# Patient Record
Sex: FEMALE | Race: BLACK OR AFRICAN AMERICAN | NOT HISPANIC OR LATINO | URBAN - METROPOLITAN AREA
[De-identification: names, ages, dates, MRNs, and addresses within clinical notes are randomized per-mention and may not be internally consistent; named-entity substitution may affect disease eponyms.]

---

## 2018-07-06 ENCOUNTER — OFFICE VISIT CONVERTED (OUTPATIENT)
Dept: INTERNAL MEDICINE | Facility: CLINIC | Age: 48
End: 2018-07-06
Attending: PHYSICIAN ASSISTANT

## 2018-08-27 ENCOUNTER — OFFICE VISIT CONVERTED (OUTPATIENT)
Dept: INTERNAL MEDICINE | Facility: CLINIC | Age: 48
End: 2018-08-27
Attending: NURSE PRACTITIONER

## 2018-09-07 ENCOUNTER — OFFICE VISIT CONVERTED (OUTPATIENT)
Dept: INTERNAL MEDICINE | Facility: CLINIC | Age: 48
End: 2018-09-07
Attending: NURSE PRACTITIONER

## 2018-10-19 ENCOUNTER — OFFICE VISIT CONVERTED (OUTPATIENT)
Dept: INTERNAL MEDICINE | Facility: CLINIC | Age: 48
End: 2018-10-19
Attending: NURSE PRACTITIONER

## 2019-01-22 ENCOUNTER — OFFICE VISIT CONVERTED (OUTPATIENT)
Dept: INTERNAL MEDICINE | Facility: CLINIC | Age: 49
End: 2019-01-22
Attending: NURSE PRACTITIONER

## 2019-01-22 ENCOUNTER — CONVERSION ENCOUNTER (OUTPATIENT)
Dept: INTERNAL MEDICINE | Facility: CLINIC | Age: 49
End: 2019-01-22

## 2019-04-19 ENCOUNTER — HOSPITAL ENCOUNTER (OUTPATIENT)
Dept: OTHER | Facility: HOSPITAL | Age: 49
Discharge: HOME | End: 2019-04-19
Attending: NURSE PRACTITIONER

## 2019-04-19 LAB
ALBUMIN SERPL-MCNC: 3.6 G/DL (ref 3.5–5)
ALBUMIN/GLOB SERPL: 1.1 {RATIO} (ref 1.4–2.6)
ALP SERPL-CCNC: 70 U/L (ref 42–98)
ALT SERPL-CCNC: 19 U/L (ref 10–40)
ANION GAP SERPL CALC-SCNC: 14 MMOL/L (ref 8–19)
AST SERPL-CCNC: 15 U/L (ref 15–50)
BILIRUB SERPL-MCNC: 0.22 MG/DL (ref 0.2–1.3)
BUN SERPL-MCNC: 11 MG/DL (ref 5–25)
BUN/CREAT SERPL: 12 {RATIO} (ref 6–20)
CALCIUM SERPL-MCNC: 9.4 MG/DL (ref 8.7–10.4)
CHLORIDE SERPL-SCNC: 104 MMOL/L (ref 99–111)
CONV CO2: 25 MMOL/L (ref 22–32)
CONV TOTAL PROTEIN: 7 G/DL (ref 6.3–8.2)
CREAT UR-MCNC: 0.95 MG/DL (ref 0.5–0.9)
EST. AVERAGE GLUCOSE BLD GHB EST-MCNC: 123 MG/DL
GFR SERPLBLD BASED ON 1.73 SQ M-ARVRAT: >60 ML/MIN/{1.73_M2}
GLOBULIN UR ELPH-MCNC: 3.4 G/DL (ref 2–3.5)
GLUCOSE SERPL-MCNC: 92 MG/DL (ref 65–99)
HBA1C MFR BLD: 5.9 % (ref 3.5–5.7)
OSMOLALITY SERPL CALC.SUM OF ELEC: 285 MOSM/KG (ref 273–304)
POTASSIUM SERPL-SCNC: 4.5 MMOL/L (ref 3.5–5.3)
SODIUM SERPL-SCNC: 138 MMOL/L (ref 135–147)

## 2019-04-20 LAB — TSH SERPL-ACNC: 4.08 M[IU]/L (ref 0.27–4.2)

## 2019-04-22 ENCOUNTER — OFFICE VISIT CONVERTED (OUTPATIENT)
Dept: INTERNAL MEDICINE | Facility: CLINIC | Age: 49
End: 2019-04-22
Attending: NURSE PRACTITIONER

## 2019-10-17 ENCOUNTER — HOSPITAL ENCOUNTER (OUTPATIENT)
Dept: OTHER | Facility: HOSPITAL | Age: 49
Discharge: HOME | End: 2019-10-17
Attending: NURSE PRACTITIONER

## 2019-10-17 ENCOUNTER — OFFICE VISIT CONVERTED (OUTPATIENT)
Dept: INTERNAL MEDICINE | Facility: CLINIC | Age: 49
End: 2019-10-17
Attending: NURSE PRACTITIONER

## 2019-10-17 LAB
ALBUMIN SERPL-MCNC: 4 G/DL (ref 3.5–5)
ALBUMIN/GLOB SERPL: 1.1 {RATIO} (ref 1.4–2.6)
ALP SERPL-CCNC: 90 U/L (ref 42–98)
ALT SERPL-CCNC: 45 U/L (ref 10–40)
ANION GAP SERPL CALC-SCNC: 15 MMOL/L (ref 8–19)
AST SERPL-CCNC: 25 U/L (ref 15–50)
BASOPHILS # BLD AUTO: 0.04 10*3/UL (ref 0–0.2)
BASOPHILS NFR BLD AUTO: 0.6 % (ref 0–3)
BILIRUB SERPL-MCNC: 0.31 MG/DL (ref 0.2–1.3)
BUN SERPL-MCNC: 11 MG/DL (ref 5–25)
BUN/CREAT SERPL: 12 {RATIO} (ref 6–20)
CALCIUM SERPL-MCNC: 9.8 MG/DL (ref 8.7–10.4)
CHLORIDE SERPL-SCNC: 102 MMOL/L (ref 99–111)
CHOLEST SERPL-MCNC: 189 MG/DL (ref 107–200)
CHOLEST/HDLC SERPL: 2.7 {RATIO} (ref 3–6)
CONV ABS IMM GRAN: 0.01 10*3/UL (ref 0–0.2)
CONV CO2: 24 MMOL/L (ref 22–32)
CONV IMMATURE GRAN: 0.2 % (ref 0–1.8)
CONV TOTAL PROTEIN: 7.6 G/DL (ref 6.3–8.2)
CREAT UR-MCNC: 0.95 MG/DL (ref 0.5–0.9)
DEPRECATED RDW RBC AUTO: 49.1 FL (ref 36.4–46.3)
EOSINOPHIL # BLD AUTO: 0.02 10*3/UL (ref 0–0.7)
EOSINOPHIL # BLD AUTO: 0.3 % (ref 0–7)
ERYTHROCYTE [DISTWIDTH] IN BLOOD BY AUTOMATED COUNT: 14.6 % (ref 11.7–14.4)
EST. AVERAGE GLUCOSE BLD GHB EST-MCNC: 126 MG/DL
GFR SERPLBLD BASED ON 1.73 SQ M-ARVRAT: >60 ML/MIN/{1.73_M2}
GLOBULIN UR ELPH-MCNC: 3.6 G/DL (ref 2–3.5)
GLUCOSE SERPL-MCNC: 97 MG/DL (ref 65–99)
HBA1C MFR BLD: 6 % (ref 3.5–5.7)
HCT VFR BLD AUTO: 39.4 % (ref 37–47)
HDLC SERPL-MCNC: 70 MG/DL (ref 40–60)
HGB BLD-MCNC: 12.5 G/DL (ref 12–16)
LDLC SERPL CALC-MCNC: 99 MG/DL (ref 70–100)
LYMPHOCYTES # BLD AUTO: 3.48 10*3/UL (ref 1–5)
LYMPHOCYTES NFR BLD AUTO: 54 % (ref 20–45)
MCH RBC QN AUTO: 29.1 PG (ref 27–31)
MCHC RBC AUTO-ENTMCNC: 31.7 G/DL (ref 33–37)
MCV RBC AUTO: 91.6 FL (ref 81–99)
MONOCYTES # BLD AUTO: 0.41 10*3/UL (ref 0.2–1.2)
MONOCYTES NFR BLD AUTO: 6.4 % (ref 3–10)
NEUTROPHILS # BLD AUTO: 2.48 10*3/UL (ref 2–8)
NEUTROPHILS NFR BLD AUTO: 38.5 % (ref 30–85)
NRBC CBCN: 0 % (ref 0–0.7)
OSMOLALITY SERPL CALC.SUM OF ELEC: 283 MOSM/KG (ref 273–304)
PLATELET # BLD AUTO: 319 10*3/UL (ref 130–400)
PMV BLD AUTO: 9.7 FL (ref 9.4–12.3)
POTASSIUM SERPL-SCNC: 4.4 MMOL/L (ref 3.5–5.3)
RBC # BLD AUTO: 4.3 10*6/UL (ref 4.2–5.4)
SODIUM SERPL-SCNC: 137 MMOL/L (ref 135–147)
T4 FREE SERPL-MCNC: 1.1 NG/DL (ref 0.9–1.8)
TRIGL SERPL-MCNC: 101 MG/DL (ref 40–150)
TSH SERPL-ACNC: 4.66 M[IU]/L (ref 0.27–4.2)
VLDLC SERPL-MCNC: 20 MG/DL (ref 5–37)
WBC # BLD AUTO: 6.44 10*3/UL (ref 4.8–10.8)

## 2019-11-12 ENCOUNTER — HOSPITAL ENCOUNTER (OUTPATIENT)
Dept: OTHER | Facility: HOSPITAL | Age: 49
Discharge: HOME | End: 2019-11-12
Attending: NURSE PRACTITIONER

## 2019-11-12 LAB
T4 FREE SERPL-MCNC: 1 NG/DL (ref 0.9–1.8)
TSH SERPL-ACNC: 3.9 M[IU]/L (ref 0.27–4.2)

## 2019-11-13 LAB
CONV ANTI MICROSOMAL AB: 15 IU/ML (ref 0–34)
T3 SERPL-MCNC: 102 NG/DL (ref 71–180)
T3FREE SERPL-MCNC: 2.2 PG/ML (ref 2–4.4)
THYROGLOBULIN ANTIBODY: <1 IU/ML (ref 0–0.9)

## 2020-01-29 ENCOUNTER — HOSPITAL ENCOUNTER (OUTPATIENT)
Dept: OTHER | Facility: HOSPITAL | Age: 50
Discharge: HOME | End: 2020-01-29
Attending: NURSE PRACTITIONER

## 2020-01-29 LAB
ALBUMIN SERPL-MCNC: 4 G/DL (ref 3.5–5)
ALBUMIN/GLOB SERPL: 1.1 {RATIO} (ref 1.4–2.6)
ALP SERPL-CCNC: 75 U/L (ref 42–98)
ALT SERPL-CCNC: 13 U/L (ref 10–40)
ANION GAP SERPL CALC-SCNC: 18 MMOL/L (ref 8–19)
AST SERPL-CCNC: 15 U/L (ref 15–50)
BILIRUB SERPL-MCNC: 0.22 MG/DL (ref 0.2–1.3)
BUN SERPL-MCNC: 10 MG/DL (ref 5–25)
BUN/CREAT SERPL: 11 {RATIO} (ref 6–20)
CALCIUM SERPL-MCNC: 10.1 MG/DL (ref 8.7–10.4)
CHLORIDE SERPL-SCNC: 101 MMOL/L (ref 99–111)
CONV CO2: 25 MMOL/L (ref 22–32)
CONV TOTAL PROTEIN: 7.5 G/DL (ref 6.3–8.2)
CREAT UR-MCNC: 0.88 MG/DL (ref 0.5–0.9)
GFR SERPLBLD BASED ON 1.73 SQ M-ARVRAT: >60 ML/MIN/{1.73_M2}
GLOBULIN UR ELPH-MCNC: 3.5 G/DL (ref 2–3.5)
GLUCOSE SERPL-MCNC: 86 MG/DL (ref 65–99)
OSMOLALITY SERPL CALC.SUM OF ELEC: 286 MOSM/KG (ref 273–304)
POTASSIUM SERPL-SCNC: 4.6 MMOL/L (ref 3.5–5.3)
SODIUM SERPL-SCNC: 139 MMOL/L (ref 135–147)

## 2020-02-10 ENCOUNTER — OFFICE VISIT CONVERTED (OUTPATIENT)
Dept: INTERNAL MEDICINE | Facility: CLINIC | Age: 50
End: 2020-02-10
Attending: PHYSICIAN ASSISTANT

## 2020-02-10 ENCOUNTER — CONVERSION ENCOUNTER (OUTPATIENT)
Dept: INTERNAL MEDICINE | Facility: CLINIC | Age: 50
End: 2020-02-10

## 2020-02-10 ENCOUNTER — HOSPITAL ENCOUNTER (OUTPATIENT)
Dept: OTHER | Facility: HOSPITAL | Age: 50
Discharge: HOME | End: 2020-02-10
Attending: PHYSICIAN ASSISTANT

## 2020-02-12 ENCOUNTER — HOSPITAL ENCOUNTER (OUTPATIENT)
Dept: MRI IMAGING | Facility: HOSPITAL | Age: 50
Discharge: HOME | End: 2020-02-12
Attending: PHYSICIAN ASSISTANT

## 2020-02-25 ENCOUNTER — OFFICE VISIT CONVERTED (OUTPATIENT)
Dept: ORTHOPEDIC SURGERY | Facility: CLINIC | Age: 50
End: 2020-02-25
Attending: ORTHOPAEDIC SURGERY

## 2020-04-24 ENCOUNTER — TELEMEDICINE CONVERTED (OUTPATIENT)
Dept: INTERNAL MEDICINE | Facility: CLINIC | Age: 50
End: 2020-04-24
Attending: NURSE PRACTITIONER

## 2020-06-05 ENCOUNTER — TELEMEDICINE CONVERTED (OUTPATIENT)
Dept: INTERNAL MEDICINE | Facility: CLINIC | Age: 50
End: 2020-06-05
Attending: NURSE PRACTITIONER

## 2020-09-10 ENCOUNTER — HOSPITAL ENCOUNTER (OUTPATIENT)
Dept: OTHER | Facility: HOSPITAL | Age: 50
Discharge: HOME | End: 2020-09-10
Attending: NURSE PRACTITIONER

## 2020-09-10 LAB
ALBUMIN SERPL-MCNC: 4 G/DL (ref 3.5–5)
ALBUMIN/GLOB SERPL: 1.3 {RATIO} (ref 1.4–2.6)
ALP SERPL-CCNC: 83 U/L (ref 42–98)
ALT SERPL-CCNC: 22 U/L (ref 10–40)
ANION GAP SERPL CALC-SCNC: 16 MMOL/L (ref 8–19)
AST SERPL-CCNC: 20 U/L (ref 15–50)
BILIRUB SERPL-MCNC: 0.24 MG/DL (ref 0.2–1.3)
BUN SERPL-MCNC: 10 MG/DL (ref 5–25)
BUN/CREAT SERPL: 9 {RATIO} (ref 6–20)
CALCIUM SERPL-MCNC: 9.7 MG/DL (ref 8.7–10.4)
CHLORIDE SERPL-SCNC: 105 MMOL/L (ref 99–111)
CONV CO2: 24 MMOL/L (ref 22–32)
CONV TOTAL PROTEIN: 7.2 G/DL (ref 6.3–8.2)
CREAT UR-MCNC: 1.15 MG/DL (ref 0.5–0.9)
EST. AVERAGE GLUCOSE BLD GHB EST-MCNC: 131 MG/DL
GFR SERPLBLD BASED ON 1.73 SQ M-ARVRAT: >60 ML/MIN/{1.73_M2}
GLOBULIN UR ELPH-MCNC: 3.2 G/DL (ref 2–3.5)
GLUCOSE SERPL-MCNC: 107 MG/DL (ref 65–99)
HBA1C MFR BLD: 6.2 % (ref 3.5–5.7)
OSMOLALITY SERPL CALC.SUM OF ELEC: 292 MOSM/KG (ref 273–304)
POTASSIUM SERPL-SCNC: 4.4 MMOL/L (ref 3.5–5.3)
SODIUM SERPL-SCNC: 141 MMOL/L (ref 135–147)
T4 FREE SERPL-MCNC: 1 NG/DL (ref 0.9–1.8)
TSH SERPL-ACNC: 4.7 M[IU]/L (ref 0.27–4.2)

## 2020-09-14 ENCOUNTER — TELEPHONE CONVERTED (OUTPATIENT)
Dept: INTERNAL MEDICINE | Facility: CLINIC | Age: 50
End: 2020-09-14
Attending: NURSE PRACTITIONER

## 2020-10-26 ENCOUNTER — HOSPITAL ENCOUNTER (OUTPATIENT)
Dept: OTHER | Facility: HOSPITAL | Age: 50
Discharge: HOME | End: 2020-10-26
Attending: NURSE PRACTITIONER

## 2020-10-26 LAB
T4 FREE SERPL-MCNC: 1 NG/DL (ref 0.9–1.8)
TSH SERPL-ACNC: 4.87 M[IU]/L (ref 0.27–4.2)

## 2020-10-27 LAB — T3 SERPL-MCNC: 107 NG/DL (ref 71–180)

## 2020-12-14 ENCOUNTER — HOSPITAL ENCOUNTER (OUTPATIENT)
Dept: OTHER | Facility: HOSPITAL | Age: 50
Discharge: HOME | End: 2020-12-14
Attending: NURSE PRACTITIONER

## 2020-12-14 LAB
ANION GAP SERPL CALC-SCNC: 13 MMOL/L (ref 8–19)
BUN SERPL-MCNC: 9 MG/DL (ref 5–25)
BUN/CREAT SERPL: 10 {RATIO} (ref 6–20)
CALCIUM SERPL-MCNC: 9 MG/DL (ref 8.7–10.4)
CHLORIDE SERPL-SCNC: 105 MMOL/L (ref 99–111)
CONV CO2: 25 MMOL/L (ref 22–32)
CREAT UR-MCNC: 0.9 MG/DL (ref 0.5–0.9)
EST. AVERAGE GLUCOSE BLD GHB EST-MCNC: 128 MG/DL
GFR SERPLBLD BASED ON 1.73 SQ M-ARVRAT: >60 ML/MIN/{1.73_M2}
GLUCOSE SERPL-MCNC: 100 MG/DL (ref 65–99)
HBA1C MFR BLD: 6.1 % (ref 3.5–5.7)
OSMOLALITY SERPL CALC.SUM OF ELEC: 287 MOSM/KG (ref 273–304)
POTASSIUM SERPL-SCNC: 4.3 MMOL/L (ref 3.5–5.3)
SODIUM SERPL-SCNC: 139 MMOL/L (ref 135–147)
T4 FREE SERPL-MCNC: 1.1 NG/DL (ref 0.9–1.8)
TSH SERPL-ACNC: 3.67 M[IU]/L (ref 0.27–4.2)

## 2020-12-17 ENCOUNTER — TELEMEDICINE CONVERTED (OUTPATIENT)
Dept: INTERNAL MEDICINE | Facility: CLINIC | Age: 50
End: 2020-12-17
Attending: NURSE PRACTITIONER

## 2021-05-12 NOTE — PROGRESS NOTES
Progress Note      Patient Name: Rocío Nash   Patient ID: [id number]   Sex: Female   YOB: 1970    Primary Care Provider: Mckenna KAMARA   Referring Provider: Mckenna KAMARA    Visit Date: 2020    Provider: ANH Amaya   Location: [facility]   Location Address: [address] [place] [address]  [id number]   Location Phone: [phone]          History Of Present Illness  Video Conferencing Visit  Rocío Nash is a 49 year old /Black female who is presenting for evaluation via video conferencing. Verbal consent obtained before beginning visit.   The following staff were present during this visit: Mckenna Mauro NP; hermila Correa   Rocío Nash is a 49 year old /Black female who presents for evaluation and treatment of:      left shoulder pain-has not been able to start PT due to covid-19 closures    weight loss  shoulder pain has kept her from some exercises   she has noticed that her clothing is fitting looser  she forgets to drink water frequently    impaired fasting glucose- doing okay with metformin but wants to lose weight and get off of this    currently in [employer]     via zoom       Past Medical History  Disease Name Date Onset Notes   Allergic rhinitis --  --    Asthma --  --    Diabetes --  --    Leg pain --  --    Migraine --  --    Osteoarthritis --  --          Past Surgical History  Procedure Name Date Notes    1997 --    Cesarian Section --  --          Medication List  Name Date Started Instructions   diclofenac sodium 75 mg oral tablet,delayed release (/EC) 2020 take 1 tablet (75 mg) by oral route 2 times per day   metformin 500 mg oral tablet 2020 take 2 tablets (1,000 mg) by oral route 2 times per day with morning and evening meals for 90 days   Zyrtec 10 mg oral tablet 10/17/2019 take 1 tablet (10 mg) by oral route once daily         Allergy  List  Allergen Name Date Reaction Notes   aspirin --  --  --    PENICILLINS --  --  --        Allergies Reconciled  Family Medical History  Disease Name Relative/Age Notes   Stroke Mother/   Mother   Heart Disease Father/   Father         Social History  Finding Status Start/Stop Quantity Notes   Alcohol Never --/-- --  --    Alcohol Use Never --/-- --  does not drink   lives with spouse --  --/-- --  --    . --  --/-- --  --    Recreational Drug Use Never --/-- --  no   Student. --  --/-- --  --    Tobacco Former --/-- --  former smoker         Immunizations  NameDate Admin Mfg Trade Name Lot Number Route Inj VIS Given VIS Publication   Urrxqdrnk31/17/2019 [facility] Fluzone Quadrivalent [id number] IM RD 10/17/2019    Comments: patient tolerated well         Review of Systems  · Constitutional  o Denies  o : fever, fatigue, weight loss, weight gain  · Cardiovascular  o Denies  o : lower extremity edema, claudication, chest pressure, palpitations  · Respiratory  o Denies  o : shortness of breath, wheezing, frequent cough, hemoptysis, dyspnea on exertion  · Gastrointestinal  o Denies  o : nausea, vomiting, diarrhea, constipation, abdominal pain      Physical Examination     Gen: well-nourished, no acute distress  HENT: atraumatic, normocephalic  Eyes: extraocular movements intact, no scleral icterus  Lung: breathing comfortably, no cough  Skin: no visible rash, no lesions  Neuro: grossly oriented to person, place, and time. no facial droop   Psych: normal mood and affect             Assessment  · Impaired fasting glucose     790.21/R73.01  she will continue with diet and exercise. cont metformin. will recheck lab in about 6 wks when covid-19 risk is lower  · Obesity     278.00/E66.9  diet and exercise discussed at length and how to work around shoulder and back pain  · Left shoulder pain     719.41/M25.512  will mail her exercises to start at home. then she will begin PT when they reopen    Problems  Reconciled  Plan  · Orders  o Thyroid Profile (05903, 13993, THYII) - 790.21/R73.01, 278.00/E66.9, 719.41/M25.512 - 05/24/2020  o ACO-39: Current medications updated and reviewed () - - 04/24/2020  · Medications  o Medications have been Reconciled  o Transition of Care or Provider Policy  · Instructions  o Patient was educated/instructed on their diagnosis, treatment and medications prior to discharge from the clinic today.  o Call the office with any concerns or questions.  · Disposition  o Call or Return if symptoms worsen or persist.  o Follow up in 6 weeks  o Labs to be printed            Electronically Signed by: ANH Amaya -Author on April 24, 2020 08:54:50 AM

## 2021-05-13 NOTE — PROGRESS NOTES
Progress Note      Patient Name: Rocío Nash   Patient ID: [id number]   Sex: Female   YOB: 1970    Primary Care Provider: Mckenna KAMARA   Referring Provider: Mckenna KAMARA    Visit Date: 2020    Provider: ANH Amaya   Location: [facility]   Location Address: [address] [place] [address]  [id number]   Location Phone: [phone]          Chief Complaint  · f/u  · questions about metformin      History Of Present Illness  Video Conferencing Visit  Rocío Nash is a 49 year old /Black female who is presenting for evaluation via video conferencing via zoom. Verbal consent obtained before beginning visit.   The following staff were present during this visit: Mckenna Mauro NP      woudl like refill of diclofenac  shoulder causing her a lot of pain    questions about metformin, received 2 letters, 1 from FDA about carcinogens  is having some issues eating healthy  knows what she needs to eat, just having trouble getting motivated to do it  exercise bike coming. she previously used an elliptical but is now bothersome to her shoulder    some anxiety at night, not sleeping well, worrying about things  she reports knowing what she needs to do but trouble getting motivated    she believes she may be perimenopausal  period in dec and then last month  some irritability   Rocío Nash is a 49 year old /Black female who presents for evaluation and treatment of:       Past Medical History  Disease Name Date Onset Notes   Allergic rhinitis --  --    Asthma --  --    Diabetes --  --    Leg pain --  --    Migraine --  --    Osteoarthritis --  --          Past Surgical History  Procedure Name Date Notes    1997 --    Cesarian Section --  --          Medication List  Name Date Started Instructions   diclofenac sodium 75 mg oral tablet,delayed release (/EC) 2020 take 1 tablet (75 mg) by oral route 2  times per day   metformin 500 mg oral tablet 04/24/2020 take 2 tablets (1,000 mg) by oral route 2 times per day with morning and evening meals for 90 days   Zyrtec 10 mg oral tablet 10/17/2019 take 1 tablet (10 mg) by oral route once daily         Allergy List  Allergen Name Date Reaction Notes   aspirin --  --  --    PENICILLINS --  --  --          Family Medical History  Disease Name Relative/Age Notes   Stroke Mother/   Mother   Heart Disease Father/   Father         Social History  Finding Status Start/Stop Quantity Notes   Alcohol Never --/-- --  --    Alcohol Use Never --/-- --  does not drink   lives with spouse --  --/-- --  --    . --  --/-- --  --    Recreational Drug Use Never --/-- --  no   Student. --  --/-- --  --    Tobacco Former 25/38 .5ppd former smoker         Immunizations  NameDate Admin Mfg Trade Name Lot Number Route Inj VIS Given VIS Publication   Oqomsnbhh08/17/2019 [facility] Fluzone Quadrivalent [id number] IM RD 10/17/2019    Comments: patient tolerated well         Review of Systems  · Constitutional  o Denies  o : fever, fatigue, weight loss, weight gain  · Cardiovascular  o Denies  o : lower extremity edema, claudication, chest pressure, palpitations  · Respiratory  o Denies  o : shortness of breath, wheezing, frequent cough, hemoptysis, dyspnea on exertion  · Gastrointestinal  o Denies  o : nausea, vomiting, diarrhea, constipation, abdominal pain      Physical Examination     Gen: well-nourished, no acute distress  HENT: atraumatic, normocephalic  Eyes: extraocular movements intact, no scleral icterus  Lung: breathing comfortably, no cough  Skin: no visible rash, no lesions  Neuro: grossly oriented to person, place, and time. no facial droop   Psych: normal mood and affect             Assessment  · Impaired fasting glucose     790.21/R73.01  will repeat labs in 3 mths, stopping metformin at present r/t safety concerns. She will work on diet and exercise  · Insomnia,  unspecified     780.52/G47.00  she does not wish to take medications. discussed sleep hygiene, thought stopping  · Shoulder pain, left     719.41/M25.512  will start PT, continue diclofenac. discussed GI irritation risk  · Perimenopausal     627.2/N95.1  discussed symptoms, role of labs, and symptom management. she reports symptoms are tolerable at present      Plan  · Orders  o Hgb A1c [facility] (50803) - 790.21/R73.01 - 09/05/2020  o CMP [facility] (63039) - 719.41/M25.512, 790.21/R73.01 - 09/05/2020  o Thyroid Profile (THYII, 76751, 70535) - 719.41/M25.512, 790.21/R73.01 - 09/05/2020  o ACO-39: Current medications updated and reviewed () - - 06/05/2020  o PHYSICAL THERAPY CONSULTATION ([facility]) - 719.41/M25.512 - 06/05/2020  · Medications  o diclofenac sodium 75 mg oral tablet,delayed release (DR/EC)   SIG: take 1 tablet (75 mg) by oral route 2 times per day for 90 days   DISP: (180) tablets with 1 refills  Adjusted on 06/05/2020     o metformin 500 mg oral tablet   SIG: take 2 tablets (1,000 mg) by oral route 2 times per day with morning and evening meals for 90 days   DISP: (360) tablets with 1 refills  Discontinued on 06/05/2020     o Medications have been Reconciled  o Transition of Care or Provider Policy  · Instructions  o Patient was educated/instructed on their diagnosis, treatment and medications prior to discharge from the clinic today.  · Disposition  o Call or Return if symptoms worsen or persist.  o Follow up in 3 months  o Labs to be printed  o Prescriptions sent electronically            Electronically Signed by: Mckenna Mauro APRN -Author on June 5, 2020 09:02:29 AM

## 2021-05-13 NOTE — PROGRESS NOTES
"   Progress Note      Patient Name: Rocío Nash   Patient ID: [id number]   Sex: Female   YOB: 1970    Primary Care Provider: Mckenna KAMARA   Referring Provider: Mckenna KAMARA    Visit Date: 2020    Provider: ANH Amaya   Location: [facility]   Location Address: [address] [place] [address]  [id number]   Location Phone: [phone]          Chief Complaint  · follow up  · \"getting lab results\"      History Of Present Illness  TELEHEALTH TELEPHONE VISIT  Rocío Nash is a 50 year old /Black female who is presenting for evaluation via telehealth telephone visit. Verbal consent obtained before beginning visit.   Provider spent 24 minutes with the patient during the telehealth visit.   The following staff were present during this visit: Mckenna Mauro NP   Past Medical History/ Overview of Patient Symptoms  Rocío Nash is a 50 year old /Black female who presents for evaluation and treatment of:      she has lost some weight again, back down to 230 lbs  she is slowly making changes to diet  she is exercising on the treadmill routinely  taking berberine to help lower glucose levels  she reports this helps with appetite, eating smaller portions  carb counting    TSH abnormal, abnormal in 10/19 followed by normal result    creatinine-taking diclofenac prn, not taking frequently       Past Medical History  Disease Name Date Onset Notes   Allergic rhinitis --  --    Asthma --  --    Diabetes --  --    Impaired fasting glucose --  --    Leg pain --  --    Migraine --  --    Osteoarthritis --  --          Past Surgical History  Procedure Name Date Notes    1997 --    Cesarian Section --  --          Medication List  Name Date Started Instructions   diclofenac sodium 75 mg oral tablet,delayed release (/EC) 2020 take 1 tablet (75 mg) by oral route 2 times per day for 90 days   Zyrtec 10 mg " oral tablet 10/17/2019 take 1 tablet (10 mg) by oral route once daily         Allergy List  Allergen Name Date Reaction Notes   aspirin --  --  --    PENICILLINS --  --  --          Family Medical History  Disease Name Relative/Age Notes   Stroke Mother/   Mother   Heart Disease Father/   Father         Social History  Finding Status Start/Stop Quantity Notes   Alcohol Never --/-- --  --    Alcohol Use Never --/-- --  does not drink   lives with spouse --  --/-- --  --    . --  --/-- --  --    Recreational Drug Use Never --/-- --  no   Student. --  --/-- --  --    Tobacco Former 25/38 .5ppd former smoker         Immunizations  NameDate Admin Mfg Trade Name Lot Number Route Inj VIS Given VIS Publication   Egyuqgmnm09/17/2019 [facility] Fluzone Quadrivalent [id number] IM RD 10/17/2019    Comments: patient tolerated well         Review of Systems  · Constitutional  o Admits  o : weight loss  o Denies  o : fever, fatigue, weight gain  · Cardiovascular  o Denies  o : lower extremity edema, claudication, chest pressure, palpitations  · Respiratory  o Denies  o : shortness of breath, wheezing, cough, hemoptysis, dyspnea on exertion  · Gastrointestinal  o Denies  o : nausea, vomiting, diarrhea, constipation, abdominal pain      Physical Examination     Gen: No acute distress  Resp: No cough, no audible wheezing  Neuro: Grossly oriented to person, place, and time  Psych: Normal mood and affect           Assessment  · Hypothyroidism     244.9/E03.9  And she has had multiple elevated TSHs in the last year that have not been subsequent. Discussed starting Synthroid at this time versus rechecking in 6 weeks. Antibodies previously not present. We will repeat labs in 6 weeks to see if her TSH is back in normal range.  · Impaired fasting glucose     790.21/R73.01  Discussed continuing with diet and exercise to help achieve weight loss. Discussed A1c today and goal to reduce this to avoid diabetes in the future. She previously had  some concerns regarding metformin safety. If A1c is elevated again in the future, consider SGLT2 class medication.  · Obesity     278.00/E66.9  · Screen for colon cancer     V76.51/Z12.11  · Creatinine elevation     790.99/R79.89  Discussed potential causes of creatinine elevation. She will avoid NSAIDs and hydrate well. Will monitor in 3 months at follow-up.      Plan  · Orders  o T3 (Total) (93958) - 244.9/E03.9 - 10/26/2020  o Thyroid Profile (33159, 31074, THYII) - 244.9/E03.9 - 10/26/2020  o Hgb A1c [facility] (37890) - 790.21/R73.01 - 12/14/2020  o BMP [facility] (36052) - 244.9/E03.9, 790.21/R73.01 - 12/14/2020  o Physician Telephone Evaluation, 21-30 minutes (99636) - - 09/14/2020  o ACO-39: Current medications updated and reviewed () - - 09/14/2020  o Gastroenterology Consultation (GASTR) - V76.51/Z12.11 - 09/14/2020  · Medications  o Medications have been Reconciled  o Transition of Care or Provider Policy  · Instructions  o Plan Of Care:   o Chronic conditions reviewed and taken into consideration for today's treatment plan.  o Patient instructed to seek medical attention urgently for new or worsening symptoms.  o Discussed Covid-19 precautions including, but not limited to, social distancing, avoid touching your face, and hand washing.   o Patient was educated/instructed on their diagnosis, treatment and medications prior to discharge from the clinic today.  · Disposition  o Call or Return if symptoms worsen or persist.  o Follow up in 3 months  o Labs to be printed            Electronically Signed by: Mckenna Mauro APRN -Author on September 14, 2020 02:07:47 PM

## 2021-05-14 NOTE — PROGRESS NOTES
Progress Note      Patient Name: Rocío Nash   Patient ID: [id number]   Sex: Female   YOB: 1970    Primary Care Provider: Mckenna KAMARA   Referring Provider: Mckenna KAMARA    Visit Date: 2020    Provider: ANH Amaya   Location: [facility]   Location Address: [address] [place] [address]  [id number]   Location Phone: [phone]          Chief Complaint  · Telehealth/Zoom      History Of Present Illness  Video Conferencing Visit  Rocío Nash is a 50 year old /Black female who is presenting for evaluation via video conferencing via Zoom. Verbal consent obtained before beginning visit.   The following staff were present during this visit: Mckenna Mauro,NP;Eh XAVIER CMA      f/u  she reports that her weight has been holding steady.  She reports she has been feeling a little bit better and having more energy since starting the Synthroid.  She is considering doing more of a plant-based diet to help her self to feel better.  She is trying to exercise but her knee occasionally keeps her from this.    she is not currently in school.  she is not currently working.  still active in [employer]    no family hx of colon cancer, no abnormal     Rocío Nash is a 50 year old /Black female who presents for evaluation and treatment of:       Past Medical History  Disease Name Date Onset Notes   Allergic rhinitis --  --    Asthma --  --    Diabetes --  --    Impaired fasting glucose --  --    Leg pain --  --    Migraine --  --    Osteoarthritis --  --          Past Surgical History  Procedure Name Date Notes    1997 --    Cesarian Section --  --          Medication List  Name Date Started Instructions   diclofenac sodium 75 mg oral tablet,delayed release (/EC) 2020 take 1 tablet (75 mg) by oral route 2 times per day for 90 days   Synthroid 25 mcg oral tablet 2020 take 1 tablet (25 mcg) by  oral route once daily on an empty stomach 30 minutes before breakfast for 30 days   Zyrtec 10 mg oral tablet 12/17/2020 take 1 tablet (10 mg) by oral route once daily for 90 days         Allergy List  Allergen Name Date Reaction Notes   aspirin --  --  --    PENICILLINS --  --  --        Allergies Reconciled  Family Medical History  Disease Name Relative/Age Notes   Stroke Mother/   Mother   Heart Disease Father/   Father         Social History  Finding Status Start/Stop Quantity Notes   Alcohol Never --/-- --  --    Alcohol Use Never --/-- --  does not drink   lives with spouse --  --/-- --  --    . --  --/-- --  --    Recreational Drug Use Never --/-- --  no   Student. --  --/-- --  --    Tobacco Former 25/38 .5ppd former smoker         Immunizations  NameDate Admin Mfg Trade Name Lot Number Route Inj VIS Given VIS Publication   Kixidomyk42/17/2019 [facility] Fluzone Quadrivalent [id number] IM RD 10/17/2019    Comments: patient tolerated well         Review of Systems  · Constitutional  o Admits  o : fatigue  o Denies  o : fever, weight loss, weight gain  · Cardiovascular  o Denies  o : lower extremity edema, claudication, chest pressure, palpitations  · Respiratory  o Denies  o : shortness of breath, wheezing, cough, hemoptysis, dyspnea on exertion  · Gastrointestinal  o Denies  o : nausea, vomiting, diarrhea, constipation, abdominal pain      Physical Examination     Gen: well-nourished, no acute distress  HENT: atraumatic, normocephalic  Eyes: extraocular movements intact, no scleral icterus  Lung: breathing comfortably, no cough  Skin: no visible rash, no lesions  Neuro: grossly oriented to person, place, and time. no facial droop   Psych: normal mood and affect                 Assessment  · Impaired fasting glucose     790.21/R73.01  She is doing well with current medications and diet modification.  · Allergic rhinitis due to allergen     477.9/J30.9  She recently ran out of Zyrtec and reports that symptoms  have flared up since this time. She would like to restart Zyrtec  · Hypothyroidism     244.9/E03.9  will continue at 25 mics currently. Discussed that she is at the upper end of normal TSH range and we could consider increasing to 50 mics daily. We will repeat labs in 8 weeks and reconsider at that time. Discussed at length how thyroid can affect many bodily processes.  · Screening for colon cancer     V76.51/Z12.11  · Visit for screening mammogram     V76.12/Z12.31      Plan  · Orders  o Thyroid Profile (24706, 01715, THYII) - 244.9/E03.9 - 02/11/2021  o Cologuard (35616) - V76.51/Z12.11 - 12/17/2020  o Screening Mammography; Bilateral 2D (24590, ) - V76.12/Z12.31 - 12/17/2020   [place]   o CBC with Auto Diff [facility] (12593) - 790.21/R73.01, 244.9/E03.9 - 04/17/2021  o CMP [facility] (22706) - 790.21/R73.01, 244.9/E03.9 - 02/11/2021  o Hgb A1c [facility] (93867) - 790.21/R73.01, 244.9/E03.9 - 04/17/2021  o Lipid Panel [facility] (61564) - 790.21/R73.01, 244.9/E03.9 - 04/17/2021  o Thyroid Profile (75621, 54227, THYII) - 790.21/R73.01, 244.9/E03.9 - 04/17/2021  o ACO-39: Current medications updated and reviewed (1159F, ) - - 12/17/2020  o CMP [facility] (35585) - 790.21/R73.01, 244.9/E03.9, V76.12/Z12.31, V76.51/Z12.11 - 04/17/2021  · Medications  o Synthroid 25 mcg oral tablet   SIG: take 1 tablet (25 mcg) by oral route once daily on an empty stomach 30 minutes before breakfast for 30 days   DISP: (30) Tablet with 1 refills  Adjusted on 12/17/2020     o Zyrtec 10 mg oral tablet   SIG: take 1 tablet (10 mg) by oral route once daily for 90 days   DISP: (90) Tablet with 3 refills  Adjusted on 12/17/2020     o Medications have been Reconciled  o Transition of Care or Provider Policy  · Instructions  o Patient was educated/instructed on their diagnosis, treatment and medications prior to discharge from the clinic today.  o Call the office with any concerns or questions.  · Disposition  o Call or Return if symptoms worsen or  persist.  o Follow up in 2 months  o Labs to be printed  o Prescriptions sent electronically            Electronically Signed by: Mckenna Mauro APRN -Author on December 17, 2020 05:05:31 PM

## 2021-05-15 VITALS
BODY MASS INDEX: 36.26 KG/M2 | TEMPERATURE: 97.2 F | SYSTOLIC BLOOD PRESSURE: 122 MMHG | OXYGEN SATURATION: 100 % | HEART RATE: 98 BPM | HEIGHT: 67 IN | RESPIRATION RATE: 15 BRPM | DIASTOLIC BLOOD PRESSURE: 74 MMHG | WEIGHT: 231 LBS

## 2021-05-15 VITALS — HEART RATE: 87 BPM | WEIGHT: 232.5 LBS | HEIGHT: 67 IN | BODY MASS INDEX: 36.49 KG/M2 | OXYGEN SATURATION: 98 %

## 2021-05-15 VITALS
OXYGEN SATURATION: 99 % | HEIGHT: 67 IN | DIASTOLIC BLOOD PRESSURE: 86 MMHG | SYSTOLIC BLOOD PRESSURE: 118 MMHG | BODY MASS INDEX: 36.27 KG/M2 | RESPIRATION RATE: 12 BRPM | TEMPERATURE: 97.6 F | WEIGHT: 231.12 LBS | HEART RATE: 69 BPM

## 2021-05-15 VITALS
DIASTOLIC BLOOD PRESSURE: 84 MMHG | HEART RATE: 87 BPM | TEMPERATURE: 97.8 F | WEIGHT: 232.12 LBS | SYSTOLIC BLOOD PRESSURE: 120 MMHG | OXYGEN SATURATION: 99 %

## 2021-05-15 VITALS
DIASTOLIC BLOOD PRESSURE: 80 MMHG | HEIGHT: 67 IN | HEART RATE: 95 BPM | OXYGEN SATURATION: 99 % | RESPIRATION RATE: 15 BRPM | TEMPERATURE: 97.6 F | SYSTOLIC BLOOD PRESSURE: 124 MMHG | BODY MASS INDEX: 36.45 KG/M2 | WEIGHT: 232.25 LBS

## 2021-05-16 VITALS
DIASTOLIC BLOOD PRESSURE: 72 MMHG | RESPIRATION RATE: 14 BRPM | HEART RATE: 77 BPM | OXYGEN SATURATION: 97 % | HEIGHT: 67 IN | SYSTOLIC BLOOD PRESSURE: 122 MMHG | TEMPERATURE: 97.1 F | WEIGHT: 244.25 LBS | BODY MASS INDEX: 38.34 KG/M2

## 2021-05-16 VITALS
DIASTOLIC BLOOD PRESSURE: 90 MMHG | HEART RATE: 87 BPM | BODY MASS INDEX: 38.61 KG/M2 | OXYGEN SATURATION: 98 % | SYSTOLIC BLOOD PRESSURE: 126 MMHG | RESPIRATION RATE: 15 BRPM | TEMPERATURE: 97.1 F | HEIGHT: 67 IN | WEIGHT: 246 LBS

## 2021-05-16 VITALS
WEIGHT: 246 LBS | RESPIRATION RATE: 15 BRPM | SYSTOLIC BLOOD PRESSURE: 112 MMHG | OXYGEN SATURATION: 100 % | DIASTOLIC BLOOD PRESSURE: 72 MMHG | TEMPERATURE: 97.7 F | BODY MASS INDEX: 38.61 KG/M2 | HEART RATE: 88 BPM | HEIGHT: 67 IN

## 2021-05-16 VITALS
DIASTOLIC BLOOD PRESSURE: 86 MMHG | BODY MASS INDEX: 37.39 KG/M2 | WEIGHT: 238.25 LBS | SYSTOLIC BLOOD PRESSURE: 130 MMHG | RESPIRATION RATE: 15 BRPM | HEART RATE: 77 BPM | HEIGHT: 67 IN | TEMPERATURE: 98.2 F | OXYGEN SATURATION: 98 %

## 2021-05-24 ENCOUNTER — HOSPITAL ENCOUNTER (OUTPATIENT)
Dept: OTHER | Facility: HOSPITAL | Age: 51
Discharge: HOME | End: 2021-05-24
Attending: NURSE PRACTITIONER

## 2021-05-24 ENCOUNTER — OFFICE VISIT CONVERTED (OUTPATIENT)
Dept: INTERNAL MEDICINE | Facility: CLINIC | Age: 51
End: 2021-05-24
Attending: NURSE PRACTITIONER

## 2021-05-24 LAB
EST. AVERAGE GLUCOSE BLD GHB EST-MCNC: 140 MG/DL
HBA1C MFR BLD: 6.5 % (ref 3.5–5.7)
T4 FREE SERPL-MCNC: 1.2 NG/DL (ref 0.9–1.8)
TSH SERPL-ACNC: 2.02 M[IU]/L (ref 0.27–4.2)

## 2021-06-05 NOTE — PROGRESS NOTES
"   Progress Note      Patient Name: Rocío Nash   Patient ID: [id number]   Sex: Female   YOB: 1970    Primary Care Provider: Mckenna KAMARA   Referring Provider: Mckenna KAMARA    Visit Date: May 24, 2021    Provider: ANH Amaya   Location: [facility]   Location Address: [address] [place] [address], [facility]  [place], [place]  [id number]   Location Phone: [phone]          Chief Complaint  · Follow up   · \"I need my thyroid checked\"   · \"I never got my box from cologuard\"       History Of Present Illness  Rocío Nash is a 50 year old /Black female who presents for evaluation and treatment of:      follow up     She mentions she has a lot going on personally, which has caused her some stress, but she says it is good stress.     She is more fatigued and worried that she needs her thyroid medication increased.  Would like her labs rechecked this visit    She says never received her cologuard box; our office is looking in to the situation.     Says she slept on her stomach last and woke up with a sore left shoulder/back muscle. She reports it feels \"tight\". She says she has had trouble with this shoulder before. Not currently taking any anti-inflammatories.       Past Medical History  Disease Name Date Onset Notes   Allergic rhinitis --  --    Asthma --  --    Diabetes --  --    Impaired fasting glucose --  --    Leg pain --  --    Migraine --  --    Osteoarthritis --  --          Past Surgical History  Procedure Name Date Notes    1997 --    Cesarian Section --  --          Medication List  Name Date Started Instructions   Synthroid 25 mcg oral tablet 2021 take 1 tablet (25 mcg) by oral route once daily on an empty stomach 30 minutes before breakfast for 30 days   Zyrtec 10 mg oral tablet 2020 take 1 tablet (10 mg) by oral route once daily for 90 days         Allergy List  Allergen Name Date Reaction Notes   aspirin --  --  --  " "  PENICILLINS --  --  --        Allergies Reconciled  Family Medical History  Disease Name Relative/Age Notes   Stroke Mother/   Mother   Heart Disease Father/   Father         Social History  Finding Status Start/Stop Quantity Notes   Alcohol Never --/-- --  --    Alcohol Use Never --/-- --  does not drink   lives with spouse --  --/-- --  --    . --  --/-- --  --    Recreational Drug Use Never --/-- --  no   Student. --  --/-- --  --    Tobacco Former 25/38 .5ppd former smoker         Immunizations  NameDate Admin Mfg Trade Name Lot Number Route Inj VIS Given VIS Publication   COVID Lauri&John04/09/2021 NE Not Entered  NE NE 05/24/2021    Comments:    Rgvrfqnrz76/17/2019 [facility] Fluzone Quadrivalent [id number] IM RD 10/17/2019    Comments: patient tolerated well         Review of Systems  · Constitutional  o Denies  o : fever, fatigue, weight loss, weight gain  · Cardiovascular  o Denies  o : lower extremity edema, claudication, chest pressure, palpitations  · Respiratory  o Denies  o : shortness of breath, wheezing, cough, hemoptysis, dyspnea on exertion  · Gastrointestinal  o Denies  o : nausea, vomiting, diarrhea, constipation, abdominal pain      Vitals  Date Time BP Position Site L\R Cuff Size HR RR TEMP (F) WT  HT  BMI kg/m2 BSA m2 O2 Sat FR L/min FiO2 HC       02/10/2020 09:16 /84 Sitting    87 - R  97.8 232lbs 2oz    99 %  21%    02/25/2020 08:31 AM      87 - R   232lbs 8oz 5'  7\" 36.41 2.23 98 %      05/24/2021 09:16 /78 Sitting    106 - R  97.7 245lbs 6oz 5'  7\" 38.43 2.29 99 %  21%          Physical Examination  · Constitutional  o Appearance  o : no acute distress, well-nourished  · Head and Face  o Head  o :   § Inspection  § : atraumatic, normocephalic  · Eyes  o Eyes  o : extraocular movements intact, no scleral icterus, no conjunctival injection  · Ears, Nose, Mouth and Throat  o Ears  o :   § External Ears  § : normal  o Nose  o :   § Intranasal Exam  § : nares patent  o Oral " Cavity  o :   § Oral Mucosa  § : moist mucous membranes  · Respiratory  o Respiratory Effort  o : breathing comfortably, symmetric chest rise  o Auscultation of Lungs  o : clear to asculatation bilaterally, no wheezes, rales, or rhonchii  · Cardiovascular  o Heart  o :   § Auscultation of Heart  § : regular rate and rhythm, no murmurs, rubs, or gallops  o Peripheral Vascular System  o :   § Extremities  § : no edema  · Musculoskeletal  o General  o :   § General Musculoskeletal  § : No joint swelling or deformity., Muscle tone, strength, and development grossly normal.  o Left Upper Extremity  o :   § Inspection/Palpation  § : Tenderness reported with palpation to left posterior back - teres minor/major muscle  § Joint Stability  § : shoulder, elbow and wrist joint stability normal  § Range of Motion  § : range of motion normal, no joint crepitus present, no pain with joint motion  · Neurologic  o Mental Status Examination  o :   § Orientation  § : grossly oriented to person, place and time  o Gait and Station  o :   § Gait Screening  § : normal gait  · Psychiatric  o General  o : normal mood and affect          Assessment  · Impaired fasting glucose     790.21/R73.01  Will recheck A1C this visit due to being slightly elevated last visit - patient is not fasting this morning  · Screening for depression     V79.0/Z13.89  · Hypothyroidism     244.9/E03.9  will recheck labs this visit  · Left shoulder pain     719.41/M25.512  Will send in short burst of ibuprofen for anti-inflammatory. No suspicious of injury, and with normal physical exam, more suspicious of muscle strain from sleeping on her shoulder wrong. Instructed her to use ice/heat for muscle pain and if the pain doesn't improve over the next few days with anti inflammatory to call the office. The patient doesn't want any muscle relaxers at this time.    Problems Reconciled  Plan  · Orders  o ACO-18: Positive screen for clinical depression using a standardized  tool and a follow-up plan documented () - V79.0/Z13.89 - 05/24/2021   Phq9 score of 8.-Not difficult at all.   o Hgb A1c [facility] (30065) - 790.21/R73.01 - 05/24/2021  o Thyroid Profile (35105, THYII, 71862) - 244.9/E03.9 - 05/24/2021   Discussed increasing the medicine even if at the upper limits of normal   o ACO-39: Current medications updated and reviewed (1159F, ) - - 05/24/2021  · Medications  o ibuprofen 800 mg oral tablet   SIG: take 1 tablet (800 mg) by oral route 3 times per day for 5 days   DISP: (30) Tablet with 1 refills  Prescribed on 05/24/2021     o diclofenac sodium 75 mg oral tablet,delayed release (DR/EC)   SIG: take 1 tablet (75 mg) by oral route 2 times per day for 90 days   DISP: (180) tablets with 1 refills  Discontinued on 05/24/2021     o Medications have been Reconciled  o Transition of Care or Provider Policy  · Instructions  o Depression Screen completed and scanned into the EMR under the designated folder within the patient's documents.  o Patient was educated/instructed on their diagnosis, treatment and medications prior to discharge from the clinic today.  o Call the office with any concerns or questions.  · Disposition  o Call or Return if symptoms worsen or persist.  o Follow up in 6 months  o Prescriptions sent electronically            Electronically Signed by: ANH Amaya -Author on May 24, 2021 05:21:46 PM

## 2021-07-07 ENCOUNTER — CLINICAL SUPPORT (OUTPATIENT)
Dept: INTERNAL MEDICINE | Facility: CLINIC | Age: 51
End: 2021-07-07

## 2021-07-07 DIAGNOSIS — E03.9 HYPOTHYROIDISM, UNSPECIFIED TYPE: Primary | ICD-10-CM

## 2021-07-07 LAB
T4 FREE SERPL-MCNC: 1.17 NG/DL (ref 0.93–1.7)
TSH SERPL DL<=0.05 MIU/L-ACNC: 2.46 UIU/ML (ref 0.27–4.2)

## 2021-07-07 PROCEDURE — 36415 COLL VENOUS BLD VENIPUNCTURE: CPT | Performed by: INTERNAL MEDICINE

## 2021-07-07 PROCEDURE — 84439 ASSAY OF FREE THYROXINE: CPT | Performed by: INTERNAL MEDICINE

## 2021-07-07 PROCEDURE — 84443 ASSAY THYROID STIM HORMONE: CPT | Performed by: INTERNAL MEDICINE

## 2021-07-15 VITALS
SYSTOLIC BLOOD PRESSURE: 124 MMHG | HEART RATE: 106 BPM | HEIGHT: 67 IN | OXYGEN SATURATION: 99 % | BODY MASS INDEX: 38.51 KG/M2 | TEMPERATURE: 97.7 F | WEIGHT: 245.37 LBS | DIASTOLIC BLOOD PRESSURE: 78 MMHG

## 2023-10-31 ENCOUNTER — OFFICE VISIT (OUTPATIENT)
Dept: INTERNAL MEDICINE | Facility: CLINIC | Age: 53
End: 2023-10-31
Payer: OTHER GOVERNMENT

## 2023-10-31 VITALS
HEIGHT: 67 IN | TEMPERATURE: 96.9 F | WEIGHT: 218 LBS | HEART RATE: 75 BPM | DIASTOLIC BLOOD PRESSURE: 88 MMHG | OXYGEN SATURATION: 100 % | SYSTOLIC BLOOD PRESSURE: 142 MMHG | BODY MASS INDEX: 34.21 KG/M2

## 2023-10-31 DIAGNOSIS — Z12.31 SCREENING MAMMOGRAM FOR BREAST CANCER: ICD-10-CM

## 2023-10-31 DIAGNOSIS — L72.3 SEBACEOUS CYST: Primary | ICD-10-CM

## 2023-10-31 PROCEDURE — 87070 CULTURE OTHR SPECIMN AEROBIC: CPT | Performed by: PHYSICIAN ASSISTANT

## 2023-10-31 PROCEDURE — 87205 SMEAR GRAM STAIN: CPT | Performed by: PHYSICIAN ASSISTANT

## 2023-10-31 RX ORDER — SULFAMETHOXAZOLE AND TRIMETHOPRIM 800; 160 MG/1; MG/1
1 TABLET ORAL 2 TIMES DAILY
Qty: 20 TABLET | Refills: 0 | Status: SHIPPED | OUTPATIENT
Start: 2023-10-31 | End: 2023-11-10

## 2023-10-31 RX ORDER — LIDOCAINE HYDROCHLORIDE AND EPINEPHRINE 10; 10 MG/ML; UG/ML
5 INJECTION, SOLUTION INFILTRATION; PERINEURAL ONCE
Status: SHIPPED | OUTPATIENT
Start: 2023-10-31

## 2023-10-31 NOTE — PROGRESS NOTES
"Chief Complaint  Mass (On back )    Subjective          Rocío Nash presents to [facility]    Mass- patient has had mass on her upper back for a couple years now.  It has not bothered her until the past few days.  She noticed that it seemed to get larger and become more tender.  She has had trouble sleeping on her back because of the pressure.  No drainage from the area.  No body aches or fevers.     Needs mammogram order    Objective   Vital Signs:   /88   Pulse 75   Temp 96.9 °F (36.1 °C)   Ht 170.2 cm (67\")   Wt 98.9 kg (218 lb)   SpO2 100%   BMI 34.14 kg/m²     Physical Exam  Vitals reviewed.   Constitutional:       Appearance: Normal appearance. She is well-developed.   HENT:      Head: Normocephalic and atraumatic.   Eyes:      Conjunctiva/sclera: Conjunctivae normal.      Pupils: Pupils are equal, round, and reactive to light.   Cardiovascular:      Rate and Rhythm: Normal rate and regular rhythm.      Heart sounds: No murmur heard.     No friction rub. No gallop.   Pulmonary:      Effort: Pulmonary effort is normal.      Breath sounds: Normal breath sounds. No wheezing or rhonchi.   Skin:     General: Skin is warm and dry.   Neurological:      Mental Status: She is alert and oriented to person, place, and time.      Cranial Nerves: No cranial nerve deficit.   Psychiatric:         Mood and Affect: Mood and affect normal.         Behavior: Behavior normal.         Thought Content: Thought content normal.         Judgment: Judgment normal.        Result Review :          Incision & Drainage    Date/Time: 10/31/2023 4:07 PM    Performed by: Guillermina Berry PA-C  Authorized by: Guillermina Berry PA-C  Type: cyst  Body area: trunk  Location details: back  Anesthesia: local infiltration    Anesthesia:  Local Anesthetic: lidocaine 1% with epinephrine  Anesthetic total: 1 mL    Sedation:  Patient sedated: no    Scalpel size: 11  Needle gauge: 20  Incision " type: single straight  Drainage: purulent  Drainage amount: copious  Wound treatment: wound left open  Packing material: none  Patient tolerance: patient tolerated the procedure well with no immediate complications            Assessment and Plan    Diagnoses and all orders for this visit:    1. Sebaceous cyst (Primary)  Assessment & Plan:  I&D performed in office.  Sample gotten, will send for culture and put patient on bactrim empirically.  Wash with soap and water, watch for fevers, body aches or erythema.  Discussed possible general surgery referral but patient wants to hold off for now.  Will have her follow up with PCP in one month to reassess.      Orders:  -     Wound Culture - Wound, Back, Upper    2. Screening mammogram for breast cancer  -     Mammo Screening Digital Tomosynthesis Bilateral With CAD; Future  -     lidocaine 1% - EPINEPHrine 1:322563 (XYLOCAINE W/EPI) 1 %-1:260824 injection 5 mL    Other orders  -     sulfamethoxazole-trimethoprim (Bactrim DS) 800-160 MG per tablet; Take 1 tablet by mouth 2 (Two) Times a Day for 10 days.  Dispense: 20 tablet; Refill: 0  -     Incision & Drainage              Follow Up   No follow-ups on file.  Patient was given instructions and counseling regarding her condition or for health maintenance advice. Please see specific information pulled into the AVS if appropriate.

## 2023-10-31 NOTE — ASSESSMENT & PLAN NOTE
I&D performed in office.  Sample gotten, will send for culture and put patient on bactrim empirically.  Wash with soap and water, watch for fevers, body aches or erythema.  Discussed possible general surgery referral but patient wants to hold off for now.  Will have her follow up with PCP in one month to reassess.

## 2023-11-04 LAB
BACTERIA SPEC AEROBE CULT: NORMAL
GRAM STN SPEC: NORMAL

## 2023-11-15 DIAGNOSIS — Z12.31 SCREENING MAMMOGRAM FOR BREAST CANCER: ICD-10-CM

## 2023-11-20 ENCOUNTER — OFFICE VISIT (OUTPATIENT)
Dept: INTERNAL MEDICINE | Facility: CLINIC | Age: 53
End: 2023-11-20
Payer: OTHER GOVERNMENT

## 2023-11-20 VITALS
TEMPERATURE: 96.2 F | HEIGHT: 67 IN | DIASTOLIC BLOOD PRESSURE: 80 MMHG | SYSTOLIC BLOOD PRESSURE: 122 MMHG | WEIGHT: 218 LBS | BODY MASS INDEX: 34.21 KG/M2 | HEART RATE: 69 BPM | OXYGEN SATURATION: 99 % | RESPIRATION RATE: 18 BRPM

## 2023-11-20 DIAGNOSIS — Z00.00 ANNUAL PHYSICAL EXAM: Primary | ICD-10-CM

## 2023-11-20 DIAGNOSIS — N17.9 AKI (ACUTE KIDNEY INJURY): ICD-10-CM

## 2023-11-20 DIAGNOSIS — Z11.59 NEED FOR HEPATITIS C SCREENING TEST: ICD-10-CM

## 2023-11-20 DIAGNOSIS — R73.03 PREDIABETES: ICD-10-CM

## 2023-11-20 DIAGNOSIS — L72.3 SEBACEOUS CYST: ICD-10-CM

## 2023-11-20 LAB
ALBUMIN SERPL-MCNC: 4.3 G/DL (ref 3.5–5.2)
ALBUMIN/GLOB SERPL: 1.4 G/DL
ALP SERPL-CCNC: 77 U/L (ref 39–117)
ALT SERPL W P-5'-P-CCNC: 17 U/L (ref 1–33)
ANION GAP SERPL CALCULATED.3IONS-SCNC: 9.9 MMOL/L (ref 5–15)
AST SERPL-CCNC: 20 U/L (ref 1–32)
BILIRUB SERPL-MCNC: <0.2 MG/DL (ref 0–1.2)
BUN SERPL-MCNC: 10 MG/DL (ref 6–20)
BUN/CREAT SERPL: 7.9 (ref 7–25)
CALCIUM SPEC-SCNC: 9.9 MG/DL (ref 8.6–10.5)
CHLORIDE SERPL-SCNC: 102 MMOL/L (ref 98–107)
CHOLEST SERPL-MCNC: 195 MG/DL (ref 0–200)
CO2 SERPL-SCNC: 26.1 MMOL/L (ref 22–29)
CREAT SERPL-MCNC: 1.27 MG/DL (ref 0.57–1)
DEPRECATED RDW RBC AUTO: 47.5 FL (ref 37–54)
EGFRCR SERPLBLD CKD-EPI 2021: 51.3 ML/MIN/1.73
ERYTHROCYTE [DISTWIDTH] IN BLOOD BY AUTOMATED COUNT: 14.2 % (ref 12.3–15.4)
GLOBULIN UR ELPH-MCNC: 3.1 GM/DL
GLUCOSE SERPL-MCNC: 94 MG/DL (ref 65–99)
HBA1C MFR BLD: 6.1 % (ref 4.8–5.6)
HCT VFR BLD AUTO: 42.2 % (ref 34–46.6)
HCV AB SER DONR QL: NORMAL
HDLC SERPL-MCNC: 73 MG/DL (ref 40–60)
HGB BLD-MCNC: 13.3 G/DL (ref 12–15.9)
LDLC SERPL CALC-MCNC: 102 MG/DL (ref 0–100)
LDLC/HDLC SERPL: 1.36 {RATIO}
LYMPHOCYTES # BLD MANUAL: 4.86 10*3/MM3 (ref 0.7–3.1)
LYMPHOCYTES NFR BLD MANUAL: 6 % (ref 5–12)
MCH RBC QN AUTO: 28.9 PG (ref 26.6–33)
MCHC RBC AUTO-ENTMCNC: 31.5 G/DL (ref 31.5–35.7)
MCV RBC AUTO: 91.7 FL (ref 79–97)
MONOCYTES # BLD: 0.43 10*3/MM3 (ref 0.1–0.9)
NEUTROPHILS # BLD AUTO: 1.86 10*3/MM3 (ref 1.7–7)
NEUTROPHILS NFR BLD MANUAL: 26 % (ref 42.7–76)
NRBC BLD AUTO-RTO: 0 /100 WBC (ref 0–0.2)
PLAT MORPH BLD: NORMAL
PLATELET # BLD AUTO: 320 10*3/MM3 (ref 140–450)
PMV BLD AUTO: 10.4 FL (ref 6–12)
POLYCHROMASIA BLD QL SMEAR: ABNORMAL
POTASSIUM SERPL-SCNC: 4.5 MMOL/L (ref 3.5–5.2)
PROT SERPL-MCNC: 7.4 G/DL (ref 6–8.5)
RBC # BLD AUTO: 4.6 10*6/MM3 (ref 3.77–5.28)
SODIUM SERPL-SCNC: 138 MMOL/L (ref 136–145)
TRIGL SERPL-MCNC: 114 MG/DL (ref 0–150)
TSH SERPL DL<=0.05 MIU/L-ACNC: 3.61 UIU/ML (ref 0.27–4.2)
VARIANT LYMPHS NFR BLD MANUAL: 68 % (ref 19.6–45.3)
VLDLC SERPL-MCNC: 20 MG/DL (ref 5–40)
WBC MORPH BLD: NORMAL
WBC NRBC COR # BLD AUTO: 7.14 10*3/MM3 (ref 3.4–10.8)

## 2023-11-20 PROCEDURE — 84443 ASSAY THYROID STIM HORMONE: CPT | Performed by: NURSE PRACTITIONER

## 2023-11-20 PROCEDURE — 80061 LIPID PANEL: CPT | Performed by: NURSE PRACTITIONER

## 2023-11-20 PROCEDURE — 85025 COMPLETE CBC W/AUTO DIFF WBC: CPT | Performed by: NURSE PRACTITIONER

## 2023-11-20 PROCEDURE — 85007 BL SMEAR W/DIFF WBC COUNT: CPT | Performed by: NURSE PRACTITIONER

## 2023-11-20 PROCEDURE — 83036 HEMOGLOBIN GLYCOSYLATED A1C: CPT | Performed by: NURSE PRACTITIONER

## 2023-11-20 PROCEDURE — 86803 HEPATITIS C AB TEST: CPT | Performed by: NURSE PRACTITIONER

## 2023-11-20 PROCEDURE — 80053 COMPREHEN METABOLIC PANEL: CPT | Performed by: NURSE PRACTITIONER

## 2023-11-20 NOTE — PROGRESS NOTES
"Chief Complaint  Sebaceous cyst  (Follow up from seeing Guillermina on 10/31/23)  Annual physical  Subjective          Rocío Nash presents to [facility]  History of Present Illness  Annual physical    She reports having a sebaceous cyst. She had I&D on 10/31. Has finished abx. Reports this is still draining. She has been taking tylenol for pain    Obesity-She is doing nuum program for weight loss  She is learning how to cope with stress  Reports doing well with weight loss    Prediabetes-previously took metformin but had abdominal cramping and vomiting  Prefers not to take medications at this time    She reports having perimenopausal symptoms, irregular period  Hot flashes, decreased libido  Does not wish to try medication at this time  Objective   Vital Signs:   /80 (BP Location: Left arm, Patient Position: Sitting, Cuff Size: Adult)   Pulse 69   Temp 96.2 °F (35.7 °C)   Resp 18   Ht 170.2 cm (67\")   Wt 98.9 kg (218 lb)   SpO2 99%   BMI 34.14 kg/m²     Physical Exam  Vitals and nursing note reviewed.   Constitutional:       General: She is not in acute distress.     Appearance: Normal appearance.   HENT:      Head: Normocephalic and atraumatic.      Right Ear: External ear normal.      Left Ear: External ear normal.      Nose: Nose normal.      Mouth/Throat:      Mouth: Mucous membranes are moist.   Eyes:      Conjunctiva/sclera: Conjunctivae normal.   Cardiovascular:      Rate and Rhythm: Normal rate and regular rhythm.      Pulses: Normal pulses.      Heart sounds: Normal heart sounds. No murmur heard.     No friction rub. No gallop.   Pulmonary:      Effort: Pulmonary effort is normal. No respiratory distress.      Breath sounds: No wheezing, rhonchi or rales.   Musculoskeletal:      Cervical back: Neck supple.      Right lower leg: No edema.      Left lower leg: No edema.   Skin:     General: Skin is warm and dry.      Comments: Sebaceous cyst of right " upper back, clear drainage present without surrounding erythema   Neurological:      General: No focal deficit present.      Mental Status: She is alert and oriented to person, place, and time.   Psychiatric:         Mood and Affect: Mood normal.         Behavior: Behavior normal.        Result Review :          Procedures      Assessment and Plan    Diagnoses and all orders for this visit:    1. Annual physical exam (Primary)  -     CBC & Differential  -     Comprehensive Metabolic Panel  -     Hemoglobin A1c  -     Lipid Panel  -     TSH    2. Sebaceous cyst    3. Prediabetes  Comments:  Discussed rechecking today.  Discussed GLP-1 class medication if A1c above 7  Orders:  -     Hemoglobin A1c    4. Need for hepatitis C screening test  -     Hepatitis C Antibody    Other orders  -     Fluzone (or Fluarix & Flulaval for VFC) >6mos    sebaceous cyst-Infection seems to be resolved. Discussed options for removal with general surgery given prolonged issues from this. Patient will wait to see if this improves a call if this worsens over the next few weeks for referral.    40 to 64: Counseling/Anticipatory Guidance Discussed: nutrition, physical activity, healthy weight, injury prevention, mental health, immunizations, and screenings      Follow Up   Return in about 6 months (around 5/20/2024).  Patient was given instructions and counseling regarding her condition or for health maintenance advice. Please see specific information pulled into the AVS if appropriate.

## 2023-12-05 ENCOUNTER — CLINICAL SUPPORT (OUTPATIENT)
Dept: INTERNAL MEDICINE | Facility: CLINIC | Age: 53
End: 2023-12-05
Payer: OTHER GOVERNMENT

## 2023-12-05 DIAGNOSIS — N17.9 AKI (ACUTE KIDNEY INJURY): ICD-10-CM

## 2023-12-05 LAB
ANION GAP SERPL CALCULATED.3IONS-SCNC: 8.5 MMOL/L (ref 5–15)
BUN SERPL-MCNC: 11 MG/DL (ref 6–20)
BUN/CREAT SERPL: 11.7 (ref 7–25)
CALCIUM SPEC-SCNC: 9.7 MG/DL (ref 8.6–10.5)
CHLORIDE SERPL-SCNC: 103 MMOL/L (ref 98–107)
CO2 SERPL-SCNC: 26.5 MMOL/L (ref 22–29)
CREAT SERPL-MCNC: 0.94 MG/DL (ref 0.57–1)
EGFRCR SERPLBLD CKD-EPI 2021: 73.6 ML/MIN/1.73
GLUCOSE SERPL-MCNC: 100 MG/DL (ref 65–99)
POTASSIUM SERPL-SCNC: 4.4 MMOL/L (ref 3.5–5.2)
SODIUM SERPL-SCNC: 138 MMOL/L (ref 136–145)

## 2023-12-05 PROCEDURE — 36415 COLL VENOUS BLD VENIPUNCTURE: CPT | Performed by: NURSE PRACTITIONER

## 2023-12-05 NOTE — PROGRESS NOTES
Venipuncture Blood Specimen Collection  Venipuncture performed in [facility] by Trena Campbell RN with good hemostasis. Patient tolerated the procedure well without complications.   12/05/23   Trena Campbell RN

## 2024-12-09 ENCOUNTER — OFFICE VISIT (OUTPATIENT)
Dept: INTERNAL MEDICINE | Facility: CLINIC | Age: 54
End: 2024-12-09
Payer: OTHER GOVERNMENT

## 2024-12-09 VITALS
DIASTOLIC BLOOD PRESSURE: 76 MMHG | BODY MASS INDEX: 37.51 KG/M2 | HEART RATE: 71 BPM | TEMPERATURE: 96.6 F | RESPIRATION RATE: 18 BRPM | OXYGEN SATURATION: 99 % | SYSTOLIC BLOOD PRESSURE: 122 MMHG | WEIGHT: 239 LBS | HEIGHT: 67 IN

## 2024-12-09 DIAGNOSIS — R73.03 PREDIABETES: ICD-10-CM

## 2024-12-09 DIAGNOSIS — N92.6 IRREGULAR PERIODS: ICD-10-CM

## 2024-12-09 DIAGNOSIS — Z12.31 ENCOUNTER FOR SCREENING MAMMOGRAM FOR MALIGNANT NEOPLASM OF BREAST: Primary | ICD-10-CM

## 2024-12-09 DIAGNOSIS — R23.2 HOT FLASHES: ICD-10-CM

## 2024-12-09 DIAGNOSIS — Z23 NEED FOR INFLUENZA VACCINATION: ICD-10-CM

## 2024-12-09 DIAGNOSIS — R14.0 ABDOMINAL BLOATING: ICD-10-CM

## 2024-12-09 DIAGNOSIS — Z12.11 SCREEN FOR COLON CANCER: ICD-10-CM

## 2024-12-09 DIAGNOSIS — Z51.81 MEDICATION MONITORING ENCOUNTER: ICD-10-CM

## 2024-12-09 DIAGNOSIS — Z00.00 ANNUAL PHYSICAL EXAM: ICD-10-CM

## 2024-12-09 DIAGNOSIS — R41.89 BRAIN FOG: ICD-10-CM

## 2024-12-09 LAB
25(OH)D3 SERPL-MCNC: 74.5 NG/ML (ref 30–100)
ALBUMIN SERPL-MCNC: 4.1 G/DL (ref 3.5–5.2)
ALBUMIN/GLOB SERPL: 1.2 G/DL
ALP SERPL-CCNC: 74 U/L (ref 39–117)
ALT SERPL W P-5'-P-CCNC: 15 U/L (ref 1–33)
ANION GAP SERPL CALCULATED.3IONS-SCNC: 8.4 MMOL/L (ref 5–15)
AST SERPL-CCNC: 19 U/L (ref 1–32)
BASOPHILS # BLD MANUAL: 0 10*3/MM3 (ref 0–0.2)
BASOPHILS NFR BLD MANUAL: 0 % (ref 0–1.5)
BILIRUB SERPL-MCNC: 0.2 MG/DL (ref 0–1.2)
BUN SERPL-MCNC: 13 MG/DL (ref 6–20)
BUN/CREAT SERPL: 13.4 (ref 7–25)
CALCIUM SPEC-SCNC: 9.9 MG/DL (ref 8.6–10.5)
CHLORIDE SERPL-SCNC: 103 MMOL/L (ref 98–107)
CHOLEST SERPL-MCNC: 194 MG/DL (ref 0–200)
CO2 SERPL-SCNC: 27.6 MMOL/L (ref 22–29)
CREAT SERPL-MCNC: 0.97 MG/DL (ref 0.57–1)
DEPRECATED RDW RBC AUTO: 42.5 FL (ref 37–54)
EGFRCR SERPLBLD CKD-EPI 2021: 70.5 ML/MIN/1.73
EOSINOPHIL # BLD MANUAL: 0 10*3/MM3 (ref 0–0.4)
EOSINOPHIL NFR BLD MANUAL: 0 % (ref 0.3–6.2)
ERYTHROCYTE [DISTWIDTH] IN BLOOD BY AUTOMATED COUNT: 13 % (ref 12.3–15.4)
FSH SERPL-ACNC: 55.9 MIU/ML
GLOBULIN UR ELPH-MCNC: 3.4 GM/DL
GLUCOSE SERPL-MCNC: 100 MG/DL (ref 65–99)
HBA1C MFR BLD: 6.1 % (ref 4.8–5.6)
HCT VFR BLD AUTO: 41.4 % (ref 34–46.6)
HDLC SERPL-MCNC: 68 MG/DL (ref 40–60)
HGB BLD-MCNC: 13.1 G/DL (ref 12–15.9)
LDLC SERPL CALC-MCNC: 113 MG/DL (ref 0–100)
LDLC/HDLC SERPL: 1.64 {RATIO}
LH SERPL-ACNC: 25 MIU/ML
LYMPHOCYTES # BLD MANUAL: 4.17 10*3/MM3 (ref 0.7–3.1)
LYMPHOCYTES NFR BLD MANUAL: 3.1 % (ref 5–12)
MCH RBC QN AUTO: 28.7 PG (ref 26.6–33)
MCHC RBC AUTO-ENTMCNC: 31.6 G/DL (ref 31.5–35.7)
MCV RBC AUTO: 90.6 FL (ref 79–97)
MONOCYTES # BLD: 0.21 10*3/MM3 (ref 0.1–0.9)
NEUTROPHILS # BLD AUTO: 2.54 10*3/MM3 (ref 1.7–7)
NEUTROPHILS NFR BLD MANUAL: 36.7 % (ref 42.7–76)
NRBC BLD AUTO-RTO: 0 /100 WBC (ref 0–0.2)
PLAT MORPH BLD: NORMAL
PLATELET # BLD AUTO: 289 10*3/MM3 (ref 140–450)
PMV BLD AUTO: 10.1 FL (ref 6–12)
POTASSIUM SERPL-SCNC: 4.6 MMOL/L (ref 3.5–5.2)
PROGEST SERPL-MCNC: 0.16 NG/ML
PROT SERPL-MCNC: 7.5 G/DL (ref 6–8.5)
RBC # BLD AUTO: 4.57 10*6/MM3 (ref 3.77–5.28)
RBC MORPH BLD: NORMAL
SODIUM SERPL-SCNC: 139 MMOL/L (ref 136–145)
TRIGL SERPL-MCNC: 73 MG/DL (ref 0–150)
TSH SERPL DL<=0.05 MIU/L-ACNC: 4.05 UIU/ML (ref 0.27–4.2)
VARIANT LYMPHS NFR BLD MANUAL: 60.2 % (ref 19.6–45.3)
VIT B12 BLD-MCNC: >2000 PG/ML (ref 211–946)
VLDLC SERPL-MCNC: 13 MG/DL (ref 5–40)
WBC MORPH BLD: NORMAL
WBC NRBC COR # BLD AUTO: 6.92 10*3/MM3 (ref 3.4–10.8)

## 2024-12-09 PROCEDURE — 82306 VITAMIN D 25 HYDROXY: CPT | Performed by: NURSE PRACTITIONER

## 2024-12-09 PROCEDURE — 83002 ASSAY OF GONADOTROPIN (LH): CPT | Performed by: NURSE PRACTITIONER

## 2024-12-09 PROCEDURE — 84443 ASSAY THYROID STIM HORMONE: CPT | Performed by: NURSE PRACTITIONER

## 2024-12-09 PROCEDURE — 99396 PREV VISIT EST AGE 40-64: CPT | Performed by: NURSE PRACTITIONER

## 2024-12-09 PROCEDURE — 85025 COMPLETE CBC W/AUTO DIFF WBC: CPT | Performed by: NURSE PRACTITIONER

## 2024-12-09 PROCEDURE — 85007 BL SMEAR W/DIFF WBC COUNT: CPT | Performed by: NURSE PRACTITIONER

## 2024-12-09 PROCEDURE — 83001 ASSAY OF GONADOTROPIN (FSH): CPT | Performed by: NURSE PRACTITIONER

## 2024-12-09 PROCEDURE — 83036 HEMOGLOBIN GLYCOSYLATED A1C: CPT | Performed by: NURSE PRACTITIONER

## 2024-12-09 PROCEDURE — 80061 LIPID PANEL: CPT | Performed by: NURSE PRACTITIONER

## 2024-12-09 PROCEDURE — 90471 IMMUNIZATION ADMIN: CPT | Performed by: NURSE PRACTITIONER

## 2024-12-09 PROCEDURE — 82672 ASSAY OF ESTROGEN: CPT | Performed by: NURSE PRACTITIONER

## 2024-12-09 PROCEDURE — 80053 COMPREHEN METABOLIC PANEL: CPT | Performed by: NURSE PRACTITIONER

## 2024-12-09 PROCEDURE — 90656 IIV3 VACC NO PRSV 0.5 ML IM: CPT | Performed by: NURSE PRACTITIONER

## 2024-12-09 PROCEDURE — 82607 VITAMIN B-12: CPT | Performed by: NURSE PRACTITIONER

## 2024-12-09 PROCEDURE — 84144 ASSAY OF PROGESTERONE: CPT | Performed by: NURSE PRACTITIONER

## 2024-12-09 PROCEDURE — 36415 COLL VENOUS BLD VENIPUNCTURE: CPT | Performed by: NURSE PRACTITIONER

## 2024-12-09 NOTE — LETTER
[facility]  Vaccine Consent Form    Patient Name:  Rocío Nash  Patient :  1970     Vaccine(s) Ordered    Pneumococcal Conjugate Vaccine 20-Valent (PCV20)        Screening Checklist  The following questions should be completed prior to vaccination. If you answer “yes” to any question, it does not necessarily mean you should not be vaccinated. It just means we may need to clarify or ask more questions. If a question is unclear, please ask your healthcare provider to explain it.    Yes No   Any fever or moderate to severe illness today (mild illness and/or antibiotic treatment are not contraindications)?     Do you have a history of a serious reaction to any previous vaccinations, such as anaphylaxis, encephalopathy within 7 days, Guillain-[place] syndrome within 6 weeks, seizure?     Have you received any live vaccine(s) (e.g MMR, YELENA) or any other vaccines in the last month (to ensure duplicate doses aren't given)?     Do you have an anaphylactic allergy to latex (DTaP, DTaP-IPV, Hep A, Hep B, MenB, RV, Td, Tdap), baker’s yeast (Hep B, HPV), polysorbates (RSV, nirsevimab, PCV 20, Rotavirrus, Tdap, Shingrix), or gelatin (YELENA, MMR)?     Do you have an anaphylactic allergy to neomycin (Rabies, YELENA, MMR, IPV, Hep A), polymyxin B (IPV), or streptomycin (IPV)?      Any cancer, leukemia, AIDS, or other immune system disorder? (YELENA, MMR, RV)     Do you have a parent, brother, or sister with an immune system problem (if immune competence of vaccine recipient clinically verified, can proceed)? (MMR, YELENA)     Any recent steroid treatments for >2 weeks, chemotherapy, or radiation treatment? (YELENA, MMR)     Have you received antibody-containing blood transfusions or IVIG in the past 11 months (recommended interval is dependent on product)? (MMR, YELENA)     Have you taken antiviral drugs (acyclovir, famciclovir, valacyclovir for YELENA) in the last 24 or 48 hours, respectively?      Are you pregnant or planning to become  "pregnant within 1 month? (YELENA, MMR, HPV, IPV, MenB, Abrexvy; For Hep B- refer to Engerix-B; For RSV - Abrysvo is indicated for 32-36 weeks of pregnancy from September to January)     For infants, have you ever been told your child has had intussusception or a medical emergency involving obstruction of the intestine (Rotavirus)? If not for an infant, can skip this question.         *Ordering Physicians/APC should be consulted if \"yes\" is checked by the patient or guardian above.  I have received, read, and understand the Vaccine Information Statement (VIS) for each vaccine ordered.  I have considered my or my child's health status as well as the health status of my close contacts.  I have taken the opportunity to discuss my vaccine questions with my or my child's health care provider.   I have requested that the ordered vaccine(s) be given to me or my child.  I understand the benefits and risks of the vaccines.  I understand that I should remain in the clinic for 15 minutes after receiving the vaccine(s).  _________________________________________________________  Signature of Patient or Parent/Legal Guardian ____________________  Date   "

## 2024-12-09 NOTE — PROGRESS NOTES
Chief Complaint  Annual Exam (Patient started spotting today. Last pap smear over 5 years- normal. Patients cycles are off. Patient has noticed hormonal changes as well. )      Subjective      History of Present Illness  The patient is a 54-year-old female presenting for her annual physical examination.    She reports experiencing menstrual irregularities and intermenstrual spotting, thus a Pap smear was deferred today. Her last Pap smear, conducted five years ago, yielded normal results. She has observed changes in her menstrual cycle, which she attributes to hormonal fluctuations. She has not had a regular menstrual cycle for at least six months, experiencing spotting and cramping without associated bloating or irritability.  She reports that periods have been irregular in the last 10 years but she was previously told that she was to young to be in menopause. the patient declines the use of contraceptives for menstrual regulation.    She attributes recent weight gain to decreased physical activity secondary to injuries and has become more mindful of her dietary habits.    The patient also reports experiencing vasomotor symptoms such as hot flashes, as well as genitourinary symptoms including vaginal dryness and tightness. Additionally, she notes bloating, occasional constipation described as cramping, and episodes of forgetfulness.    Her current supplementation includes a daily women's multivitamin, vitamin C, vitamin D, zinc, and vitamin B12.    SOCIAL HISTORY  Smoked in the , quit 10 years ago.    FAMILY HISTORY  Mother passed 12/2023 from stomach cancer, diagnosed with diverticulitis, 3 pack/day smoker. Grandmother passed 01/2024 at 99. Father's mother passed a few years ago at 98.         Objective   Vital Signs:   Vitals:    12/09/24 1027   BP: 122/76   BP Location: Left arm   Patient Position: Sitting   Cuff Size: Adult   Pulse: 71   Resp: 18   Temp: 96.6 °F (35.9 °C)   TempSrc: Temporal   SpO2: 99%  "  Weight: 108 kg (239 lb)   Height: 170.2 cm (67\")     Body mass index is 37.43 kg/m².    Wt Readings from Last 3 Encounters:   12/09/24 108 kg (239 lb)   11/20/23 98.9 kg (218 lb)   10/31/23 98.9 kg (218 lb)     BP Readings from Last 3 Encounters:   12/09/24 122/76   11/20/23 122/80   10/31/23 142/88       Health Maintenance   Topic Date Due    Pneumococcal Vaccine 0-64 (1 of 2 - PCV) Never done    TDAP/TD VACCINES (1 - Tdap) Never done    PAP SMEAR  09/07/2021    ZOSTER VACCINE (1 of 2) Never done    COVID-19 Vaccine (2 - 2024-25 season) 09/01/2024    COLORECTAL CANCER SCREENING  10/06/2024    ANNUAL PHYSICAL  11/20/2024    MAMMOGRAM  11/09/2025    BMI FOLLOWUP  12/09/2025    HEPATITIS C SCREENING  Completed    INFLUENZA VACCINE  Completed       Physical Exam  Vitals and nursing note reviewed.   Constitutional:       General: She is not in acute distress.     Appearance: Normal appearance.   HENT:      Head: Normocephalic and atraumatic.      Right Ear: External ear normal.      Left Ear: External ear normal.      Nose: Nose normal.      Mouth/Throat:      Mouth: Mucous membranes are moist.   Eyes:      Conjunctiva/sclera: Conjunctivae normal.   Cardiovascular:      Rate and Rhythm: Normal rate and regular rhythm.      Pulses: Normal pulses.      Heart sounds: Normal heart sounds. No murmur heard.     No friction rub. No gallop.   Pulmonary:      Effort: Pulmonary effort is normal. No respiratory distress.      Breath sounds: No wheezing, rhonchi or rales.   Musculoskeletal:      Cervical back: Neck supple.      Right lower leg: No edema.      Left lower leg: No edema.   Skin:     General: Skin is warm and dry.   Neurological:      General: No focal deficit present.      Mental Status: She is alert and oriented to person, place, and time.   Psychiatric:         Mood and Affect: Mood normal.         Behavior: Behavior normal.        Physical Exam        Result Review :  The following data was reviewed by: Mckenna DAMICO" ANH Mauro on 12/09/2024:         Results              Procedures            Assessment & Plan  Annual physical exam    Orders:    Comprehensive Metabolic Panel    CBC & Differential    TSH    Lipid Panel    Encounter for screening mammogram for malignant neoplasm of breast    Orders:    Mammo Screening Digital Tomosynthesis Bilateral With CAD; Future    Need for influenza vaccination    Orders:    Fluzone >6mos (7467-2377)    Prediabetes    Orders:    Hemoglobin A1c    Irregular periods    Orders:    Estrogens, Total    Follicle Stimulating Hormone    Luteinizing Hormone    Progesterone    US Non-ob Transvaginal; Future    Hot flashes    Orders:    Estrogens, Total    Follicle Stimulating Hormone    Luteinizing Hormone    Progesterone    Abdominal bloating    Orders:    US Non-ob Transvaginal; Future    Medication monitoring encounter    Orders:    Vitamin D,25-Hydroxy    Brain fog    Orders:    Vitamin B12    Screen for colon cancer    Orders:    Cologuard - Stool, Per Rectum; Future         Assessment & Plan  1. Irregular periods  - Spotting today  - Pap smear in 4-6 weeks  - Check hormone levels for perimenopause  - Transvaginal ultrasound for uterine lining, fibroids, or abnormalities    2. Perimenopause symptoms  - Reports hot flashes, bloating, dryness  - Check hormone levels    3. Family history of stomach cancer  - Monitor for concerning symptoms    4. Health Maintenance  - Comprehensive tests: cholesterol, blood counts, thyroid, kidney, liver function, A1c  - Schedule mammogram  - Administer influenza vaccine today  - Pneumonia vaccine in 2 weeks    - Order Cologuard test    5. Brain fog  - Check vitamin B12 and D levels  - reassess at follow up    Follow-up  - Return in 4-6 weeks    Patient or patient representative verbalized consent for the use of Ambient Listening during the visit with  ANH Crocker for chart documentation. 12/9/2024  12:54 EST  40 to 64: Counseling/Anticipatory Guidance  Discussed: nutrition, physical activity, healthy weight, injury prevention, immunizations, and screenings        FOLLOW UP  Return in about 6 weeks (around 1/20/2025) for follow up/pap smear.  Patient was given instructions and counseling regarding her condition or for health maintenance advice. Please see specific information pulled into the AVS if appropriate.     Mckenna Mauro, APRN  12/09/24  12:55 EST    CURRENT & DISCONTINUED MEDICATIONS  Current Outpatient Medications   Medication Instructions    Multiple Vitamins-Minerals (WOMENS MULTI PO) Take  by mouth.       There are no discontinued medications.

## 2024-12-13 LAB — ESTROGEN SERPL-MCNC: 60 PG/ML

## 2024-12-24 ENCOUNTER — HOSPITAL ENCOUNTER (OUTPATIENT)
Dept: ULTRASOUND IMAGING | Facility: HOSPITAL | Age: 54
Discharge: HOME | End: 2024-12-24
Admitting: NURSE PRACTITIONER
Payer: OTHER GOVERNMENT

## 2024-12-24 DIAGNOSIS — R14.0 ABDOMINAL BLOATING: ICD-10-CM

## 2024-12-24 DIAGNOSIS — N92.6 IRREGULAR PERIODS: ICD-10-CM

## 2024-12-24 PROCEDURE — 76830 TRANSVAGINAL US NON-OB: CPT

## 2025-01-20 ENCOUNTER — OFFICE VISIT (OUTPATIENT)
Dept: INTERNAL MEDICINE | Facility: CLINIC | Age: 55
End: 2025-01-20
Payer: OTHER GOVERNMENT

## 2025-01-20 VITALS
BODY MASS INDEX: 37.51 KG/M2 | HEIGHT: 67 IN | TEMPERATURE: 97.2 F | DIASTOLIC BLOOD PRESSURE: 72 MMHG | SYSTOLIC BLOOD PRESSURE: 120 MMHG | HEART RATE: 68 BPM | OXYGEN SATURATION: 99 % | WEIGHT: 239 LBS | RESPIRATION RATE: 18 BRPM

## 2025-01-20 DIAGNOSIS — N89.8 VAGINAL DRYNESS: ICD-10-CM

## 2025-01-20 DIAGNOSIS — R23.2 HOT FLASHES: ICD-10-CM

## 2025-01-20 DIAGNOSIS — N92.6 IRREGULAR PERIODS: ICD-10-CM

## 2025-01-20 DIAGNOSIS — N95.1 PERIMENOPAUSAL: Primary | ICD-10-CM

## 2025-01-20 PROCEDURE — 99214 OFFICE O/P EST MOD 30 MIN: CPT | Performed by: NURSE PRACTITIONER

## 2025-01-20 NOTE — PROGRESS NOTES
"Chief Complaint  Irregular periods (6 week follow up ) and Perimenopause symptoms      Subjective      History of Present Illness  The patient is a 54-year-old female with regular menstrual cycles, presenting with concerns indicative of perimenopausal symptoms.    Since her last visit, she reports experiencing only spotting, accompanied by dyspareunia, vaginal dryness, mood swings, irritability, and vasomotor symptoms such as hot flashes. She attributes a significant portion of her symptomatology to academic stress, which she manages through dietary modifications and regular physical activity. Notably, she has observed a reduction in sugar cravings. The patient expresses concerns regarding potential weight gain associated with aging. Additionally, she reports discomfort in her knees, a history of left plantar fasciitis, and chronic back pain which limit her ability to exercise. .         Objective   Vital Signs:   Vitals:    01/20/25 0859   BP: 120/72   BP Location: Left arm   Patient Position: Sitting   Cuff Size: Large Adult   Pulse: 68   Resp: 18   Temp: 97.2 °F (36.2 °C)   TempSrc: Temporal   SpO2: 99%   Weight: 108 kg (239 lb)   Height: 170.2 cm (67\")     Body mass index is 37.43 kg/m².    Wt Readings from Last 3 Encounters:   01/20/25 108 kg (239 lb)   12/09/24 108 kg (239 lb)   11/20/23 98.9 kg (218 lb)     BP Readings from Last 3 Encounters:   01/20/25 120/72   12/09/24 122/76   11/20/23 122/80       Health Maintenance   Topic Date Due    Pneumococcal Vaccine 0-64 (1 of 2 - PCV) Never done    TDAP/TD VACCINES (1 - Tdap) Never done    PAP SMEAR  09/07/2021    ZOSTER VACCINE (1 of 2) Never done    COVID-19 Vaccine (2 - 2024-25 season) 09/01/2024    ANNUAL PHYSICAL  12/09/2025    BMI FOLLOWUP  12/09/2025    MAMMOGRAM  12/31/2026    COLORECTAL CANCER SCREENING  12/17/2027    HEPATITIS C SCREENING  Completed    INFLUENZA VACCINE  Completed       Physical Exam  Vitals and nursing note reviewed.   Constitutional:  "      General: She is not in acute distress.     Appearance: Normal appearance.   HENT:      Head: Normocephalic and atraumatic.      Right Ear: External ear normal.      Left Ear: External ear normal.      Nose: Nose normal.      Mouth/Throat:      Mouth: Mucous membranes are moist.   Eyes:      Conjunctiva/sclera: Conjunctivae normal.   Cardiovascular:      Rate and Rhythm: Normal rate and regular rhythm.      Pulses: Normal pulses.      Heart sounds: Normal heart sounds. No murmur heard.     No friction rub. No gallop.   Pulmonary:      Effort: Pulmonary effort is normal. No respiratory distress.      Breath sounds: No wheezing, rhonchi or rales.   Musculoskeletal:      Cervical back: Neck supple.      Right lower leg: No edema.      Left lower leg: No edema.   Skin:     General: Skin is warm and dry.   Neurological:      General: No focal deficit present.      Mental Status: She is alert and oriented to person, place, and time.   Psychiatric:         Mood and Affect: Mood normal.         Behavior: Behavior normal.        Physical Exam        Result Review :  The following data was reviewed by: ANH Crocker on 01/20/2025:         Results  Ultrasound showed no concerning uterine mass, normal endometrial stripe, and normal ovaries with no solid ovarian mass.            Procedures            Assessment & Plan  Perimenopausal         Vaginal dryness         Hot flashes         Irregular periods              Assessment & Plan  1. Perimenopausal symptoms  - Symptoms: irregular periods, dyspareunia, vaginal dryness, mood changes, and hot flashes likely due to perimenopause  - Advised maintaining a healthy lifestyle with regular exercise, balanced diet, and adequate sleep  - Encouraged low-impact exercises on nourish move Love on youtube  - If symptoms persist or worsen, consider hormone replacement therapy or SNRI medications like Effexor  -Discussed options at length for topical estrogen for dyspareunia and  vaginal dryness  -Discussed hormone replacement therapy  -She prefers to continue to work on healthy lifestyle habits prior to considering further pharmacological intervention.    2.  Irregular menses  - Ultrasound results were normal, discussed likely perimenopausal    Follow-up  - In 6 to 8 weeks    Patient or patient representative verbalized consent for the use of Ambient Listening during the visit with  ANH Crocker for chart documentation. 1/20/2025  09:31 EST      FOLLOW UP  No follow-ups on file.  Patient was given instructions and counseling regarding her condition or for health maintenance advice. Please see specific information pulled into the AVS if appropriate.     ANH Crocker  01/20/25  13:08 EST    CURRENT & DISCONTINUED MEDICATIONS  Current Outpatient Medications   Medication Instructions    Multiple Vitamins-Minerals (WOMENS MULTI PO) Take  by mouth.       There are no discontinued medications.

## 2025-03-18 ENCOUNTER — OFFICE VISIT (OUTPATIENT)
Dept: INTERNAL MEDICINE | Facility: CLINIC | Age: 55
End: 2025-03-18
Payer: OTHER GOVERNMENT

## 2025-03-18 VITALS
SYSTOLIC BLOOD PRESSURE: 124 MMHG | DIASTOLIC BLOOD PRESSURE: 80 MMHG | BODY MASS INDEX: 38.61 KG/M2 | HEART RATE: 69 BPM | TEMPERATURE: 97 F | OXYGEN SATURATION: 98 % | HEIGHT: 67 IN | WEIGHT: 246 LBS | RESPIRATION RATE: 16 BRPM

## 2025-03-18 DIAGNOSIS — N89.8 VAGINAL DRYNESS: ICD-10-CM

## 2025-03-18 DIAGNOSIS — N95.1 PERIMENOPAUSAL VASOMOTOR SYMPTOMS: Primary | ICD-10-CM

## 2025-03-18 DIAGNOSIS — R21 RASH: ICD-10-CM

## 2025-03-18 PROCEDURE — 99214 OFFICE O/P EST MOD 30 MIN: CPT | Performed by: NURSE PRACTITIONER

## 2025-03-18 RX ORDER — ESTRADIOL 0.1 MG/G
1 CREAM VAGINAL 2 TIMES WEEKLY
Qty: 42.5 G | Refills: 2 | Status: SHIPPED | OUTPATIENT
Start: 2025-03-20

## 2025-03-18 NOTE — PROGRESS NOTES
Chief Complaint  perimenopausal (Follow up), Hot Flashes, vaginal dryness, irregular periods (PT states she is still having symptoms. ), and Allergic Reaction (PT would like to be allergy tested. She states she took something and broke out into a rash. IT is going away now, but she would like to know what caused it )      Subjective      History of Present Illness  The patient is a 54-year-old female presenting for follow-up regarding perimenopausal symptoms, including vasomotor symptoms (hot flashes), vaginal dryness, and irregular menstrual cycles. She expresses concern about a potential allergic reaction manifesting as a rash.    The patient continues to experience vasomotor symptoms and irregular menstrual cycles. She is interested in initiating Estrace (estradiol) for the management of vaginal dryness.    The patient reports a recent rash on her neck, which she attributes to a reformulated collagen product. The rash initially presented as a dry patch. No topical treatments have been applied. She recalls experiencing throat pruritus after consuming a blend of Macha powder, which resolved upon discontinuation. She notes no issues with Macha powder purchased from [employer]. She has not consulted an allergist regarding these symptoms.    The patient and her  are considering lifestyle modifications, including engaging in physical training and adjusting their sleep schedule. She typically retires between 10:00 PM and 11:00 PM, falls asleep around midnight, and wakes up early, often feeling groggy. Recently, she attempted moderate walking on a treadmill but experienced severe knee pain the following day, which she suspects may be related to weather changes.             Objective   Vital Signs:   Vitals:    03/18/25 0825   BP: 124/80   BP Location: Left arm   Patient Position: Sitting   Cuff Size: Large Adult   Pulse: 69   Resp: 16   Temp: 97 °F (36.1 °C)   TempSrc: Temporal   SpO2: 98%   Weight: 112 kg (246 lb)  "  Height: 170.2 cm (67\")     Body mass index is 38.53 kg/m².    Wt Readings from Last 3 Encounters:   03/18/25 112 kg (246 lb)   01/20/25 108 kg (239 lb)   12/09/24 108 kg (239 lb)     BP Readings from Last 3 Encounters:   03/18/25 124/80   01/20/25 120/72   12/09/24 122/76       Health Maintenance   Topic Date Due    Pneumococcal Vaccine 50+ (1 of 2 - PCV) Never done    TDAP/TD VACCINES (1 - Tdap) Never done    PAP SMEAR  09/07/2021    ZOSTER VACCINE (1 of 2) Never done    COVID-19 Vaccine (2 - 2024-25 season) 09/01/2024    ANNUAL PHYSICAL  12/09/2025    BMI FOLLOWUP  12/09/2025    MAMMOGRAM  12/31/2026    COLORECTAL CANCER SCREENING  12/17/2027    HEPATITIS C SCREENING  Completed    INFLUENZA VACCINE  Completed       Physical Exam  Vitals and nursing note reviewed.   Constitutional:       General: She is not in acute distress.     Appearance: Normal appearance.   HENT:      Head: Normocephalic and atraumatic.      Right Ear: External ear normal.      Left Ear: External ear normal.      Nose: Nose normal.      Mouth/Throat:      Mouth: Mucous membranes are moist.   Eyes:      Conjunctiva/sclera: Conjunctivae normal.   Cardiovascular:      Rate and Rhythm: Normal rate and regular rhythm.      Pulses: Normal pulses.      Heart sounds: Normal heart sounds. No murmur heard.     No friction rub. No gallop.   Pulmonary:      Effort: Pulmonary effort is normal. No respiratory distress.      Breath sounds: No wheezing, rhonchi or rales.   Musculoskeletal:      Cervical back: Neck supple.      Right lower leg: No edema.      Left lower leg: No edema.   Skin:     General: Skin is warm and dry.   Neurological:      General: No focal deficit present.      Mental Status: She is alert and oriented to person, place, and time.   Psychiatric:         Mood and Affect: Mood normal.         Behavior: Behavior normal.        Physical Exam        Result Review :  The following data was reviewed by: ANH Crocker on 03/18/2025:     "     Results              Procedures            Assessment & Plan  Perimenopausal vasomotor symptoms         Vaginal dryness         Rash              Assessment & Plan  1. Perimenopausal symptoms:  - Experiencing hot flashes, vaginal dryness, and irregular periods  -She does not wish to try any other oral medications to help with vasomotor symptoms at this time.  She will continue to work on healthy lifestyle habits to promote good sleep hygiene, healthy weight, and increasing physical activity    2.  Vaginal dryness  - Prescribed Estrace 1 g, apply twice weekly for one month  - Abstain from sexual intercourse on application nights  - Seek medical attention if symptoms of infection arise    3. Allergic reaction:  - Rash after using a specific collagen product and throat itching after using a Macha powder blend  - Advised to avoid products causing allergic reactions  - Discussed allergist referral; patient hesitant  - Informed that allergists might not test for all specific chemicals in supplements    Patient or patient representative verbalized consent for the use of Ambient Listening during the visit with  ANH Crocker for chart documentation. 3/18/2025  14:43 EDT      FOLLOW UP  Return in about 2 months (around 5/18/2025).  Patient was given instructions and counseling regarding her condition or for health maintenance advice. Please see specific information pulled into the AVS if appropriate.     ANH Crocker  03/18/25  14:46 EDT    CURRENT & DISCONTINUED MEDICATIONS  Current Outpatient Medications   Medication Instructions    [START ON 3/20/2025] estradiol (ESTRACE VAGINAL) 0.1 MG/GM vaginal cream 1 applicator, Vaginal, 2 Times Weekly    Multiple Vitamins-Minerals (WOMENS MULTI PO) Take  by mouth.       There are no discontinued medications.